# Patient Record
Sex: MALE | Race: WHITE | Employment: FULL TIME | URBAN - METROPOLITAN AREA
[De-identification: names, ages, dates, MRNs, and addresses within clinical notes are randomized per-mention and may not be internally consistent; named-entity substitution may affect disease eponyms.]

---

## 2021-10-18 ENCOUNTER — HOSPITAL ENCOUNTER (EMERGENCY)
Age: 51
Discharge: HOME | End: 2021-10-18
Attending: EMERGENCY MEDICINE

## 2021-10-18 ENCOUNTER — APPOINTMENT (OUTPATIENT)
Dept: GENERAL RADIOLOGY | Age: 51
End: 2021-10-18
Attending: EMERGENCY MEDICINE

## 2021-10-18 VITALS
OXYGEN SATURATION: 96 % | HEIGHT: 68 IN | HEART RATE: 91 BPM | BODY MASS INDEX: 45.47 KG/M2 | WEIGHT: 300 LBS | TEMPERATURE: 97.9 F | RESPIRATION RATE: 20 BRPM | DIASTOLIC BLOOD PRESSURE: 86 MMHG | SYSTOLIC BLOOD PRESSURE: 132 MMHG

## 2021-10-18 DIAGNOSIS — S49.91XA INJURY OF RIGHT SHOULDER, INITIAL ENCOUNTER: Primary | ICD-10-CM

## 2021-10-18 PROCEDURE — 74011636637 HC RX REV CODE- 636/637: Performed by: EMERGENCY MEDICINE

## 2021-10-18 PROCEDURE — 73030 X-RAY EXAM OF SHOULDER: CPT

## 2021-10-18 PROCEDURE — 99283 EMERGENCY DEPT VISIT LOW MDM: CPT

## 2021-10-18 PROCEDURE — 72050 X-RAY EXAM NECK SPINE 4/5VWS: CPT

## 2021-10-18 RX ORDER — HYDROCODONE BITARTRATE AND ACETAMINOPHEN 5; 325 MG/1; MG/1
1 TABLET ORAL
Qty: 12 TABLET | Refills: 0 | Status: SHIPPED | OUTPATIENT
Start: 2021-10-18 | End: 2021-10-21

## 2021-10-18 RX ORDER — PREDNISONE 50 MG/1
50 TABLET ORAL DAILY
Qty: 7 TABLET | Refills: 0 | Status: SHIPPED | OUTPATIENT
Start: 2021-10-18 | End: 2021-10-25

## 2021-10-18 RX ADMIN — PREDNISONE 60 MG: 10 TABLET ORAL at 10:20

## 2021-10-18 NOTE — ED TRIAGE NOTES
Pt states he was doing some work around the house last Tuesday and has been having increasing pain in right shoulder, right neck, and now having some tingling and numbness in right hand. States he has had shoulder surgery years ago. Very painful to lift arm to side or in front on him. Masked for triage.

## 2021-10-18 NOTE — ED PROVIDER NOTES
Patient presents with complaints of right shoulder pain onset about 2 days ago. There is gradually gotten worse. The pain is worse if he lays on his right shoulder trying to sleep. He complains of some paresthesias of the right thumb and index and middle fingers as well. He has a history of previous injury to the rotator cuff of the right shoulder with surgical repair in the past.  He denies any overt injury but states he was working on his jeep 2 days ago for an extended period of time and he is right-hand dominant. The history is provided by the patient. Shoulder Pain   The incident occurred 2 days ago. The incident occurred at home. Injury mechanism: Possibly overuse. The right shoulder is affected. The pain is at a severity of 8/10. The pain is severe. The pain has been constant since onset. The pain radiates. There is a history of shoulder injury. He has no other injuries. There is a history of shoulder surgery. Associated symptoms include numbness. Patient presents    No past medical history on file. No past surgical history on file. No family history on file. Social History     Socioeconomic History    Marital status: SINGLE     Spouse name: Not on file    Number of children: Not on file    Years of education: Not on file    Highest education level: Not on file   Occupational History    Not on file   Tobacco Use    Smoking status: Not on file   Substance and Sexual Activity    Alcohol use: Not on file    Drug use: Not on file    Sexual activity: Not on file   Other Topics Concern    Not on file   Social History Narrative    Not on file     Social Determinants of Health     Financial Resource Strain:     Difficulty of Paying Living Expenses:    Food Insecurity:     Worried About Running Out of Food in the Last Year:     [address] in the Last Year:    Transportation Needs:     Lack of Transportation (Medical):      Lack of Transportation (Non-Medical):    Physical Activity:     Days of Exercise per Week:     Minutes of Exercise per Session:    Stress:     Feeling of Stress :    Social Connections:     Frequency of Communication with Friends and Family:     Frequency of Social Gatherings with Friends and Family:     Attends Confucianism Services:     Active Member of Clubs or Organizations:     Attends Club or Organization Meetings:     Marital Status:    Intimate Partner Violence:     Fear of Current or Ex-Partner:     Emotionally Abused:     Physically Abused:     Sexually Abused: ALLERGIES: Patient has no known allergies. Review of Systems   Neurological: Positive for numbness. All other systems reviewed and are negative. Vitals:    10/18/21 1000   BP: 137/64   Pulse: 91   Resp: 20   Temp: 97.9 °F (36.6 °C)   SpO2: 98%   Weight: 136.1 kg (300 lb)   Height: 5' 8\" (1.727 m)            Physical Exam  Vitals and nursing note reviewed. Constitutional:       General: He is not in acute distress. Appearance: Normal appearance. He is not ill-appearing, toxic-appearing or diaphoretic. HENT:      Head: Normocephalic and atraumatic. Neck:      Comments: Tenderness is noted to the right side of the cervical spine extending into the teres major and trapezius. Spurling test positive. Musculoskeletal:         General: Tenderness present. Normal range of motion. Cervical back: Normal range of motion. Tenderness present. No rigidity. Comments: There is tenderness noted anterior laterally and posteriorly to the shoulder girdle. Decreased range of motion in all planes. There is weakness on resistance to lateral extension. Lymphadenopathy:      Cervical: No cervical adenopathy. Skin:     General: Skin is warm and dry. Capillary Refill: Capillary refill takes less than 2 seconds. Neurological:      General: No focal deficit present. Mental Status: He is alert and oriented to person, place, and time.  Mental status is at baseline. Psychiatric:         Mood and Affect: Mood normal.         Behavior: Behavior normal.         Thought Content: Thought content normal.          MDM  Number of Diagnoses or Management Options  Diagnosis management comments: Differential diagnosis is rotator cuff injury or adhesive capsulitis versus cervical radiculopathy.        Amount and/or Complexity of Data Reviewed  Tests in the radiology section of CPT®: ordered and reviewed  Independent visualization of images, tracings, or specimens: yes    Risk of Complications, Morbidity, and/or Mortality  Presenting problems: low  Diagnostic procedures: low  Management options: low    Patient Progress  Patient progress: stable         Procedures

## 2021-10-18 NOTE — ED NOTES
I have reviewed discharge instructions with the patient. The patient verbalized understanding. Patient left ED via Discharge Method: ambulatory to Home with self. Opportunity for questions and clarification provided. Patient given 2 scripts. To continue your aftercare when you leave the hospital, you may receive an automated call from our care team to check in on how you are doing. This is a free service and part of our promise to provide the best care and service to meet your aftercare needs.  If you have questions, or wish to unsubscribe from this service please call [phone]. Thank you for Choosing our [facility] Emergency Department.

## 2022-07-13 ENCOUNTER — HOSPITAL ENCOUNTER (EMERGENCY)
Dept: ULTRASOUND IMAGING | Age: 52
Discharge: HOME | End: 2022-07-16

## 2022-07-13 ENCOUNTER — HOSPITAL ENCOUNTER (EMERGENCY)
Dept: GENERAL RADIOLOGY | Age: 52
Discharge: HOME | End: 2022-07-16

## 2022-07-13 ENCOUNTER — HOSPITAL ENCOUNTER (EMERGENCY)
Age: 52
Discharge: HOME | End: 2022-07-13
Attending: EMERGENCY MEDICINE

## 2022-07-13 VITALS
RESPIRATION RATE: 17 BRPM | HEART RATE: 74 BPM | OXYGEN SATURATION: 94 % | DIASTOLIC BLOOD PRESSURE: 76 MMHG | WEIGHT: 260 LBS | TEMPERATURE: 98 F | SYSTOLIC BLOOD PRESSURE: 130 MMHG | HEIGHT: 67 IN | BODY MASS INDEX: 40.81 KG/M2

## 2022-07-13 DIAGNOSIS — M79.661 RIGHT CALF PAIN: Primary | ICD-10-CM

## 2022-07-13 DIAGNOSIS — M77.31 HEEL SPUR, RIGHT: ICD-10-CM

## 2022-07-13 PROCEDURE — 73630 X-RAY EXAM OF FOOT: CPT

## 2022-07-13 PROCEDURE — 99283 EMERGENCY DEPT VISIT LOW MDM: CPT

## 2022-07-13 PROCEDURE — 73610 X-RAY EXAM OF ANKLE: CPT

## 2022-07-13 PROCEDURE — 93971 EXTREMITY STUDY: CPT

## 2022-07-13 RX ORDER — NAPROXEN 500 MG/1
500 TABLET ORAL 2 TIMES DAILY WITH MEALS
Qty: 14 TABLET | Refills: 0 | Status: SHIPPED | OUTPATIENT
Start: 2022-07-13 | End: 2022-09-30 | Stop reason: ALTCHOICE

## 2022-07-13 ASSESSMENT — PAIN - FUNCTIONAL ASSESSMENT: PAIN_FUNCTIONAL_ASSESSMENT: 0-10

## 2022-07-13 ASSESSMENT — PAIN DESCRIPTION - LOCATION
LOCATION: FOOT
LOCATION: LEG;FOOT

## 2022-07-13 ASSESSMENT — ENCOUNTER SYMPTOMS
SHORTNESS OF BREATH: 0
COLOR CHANGE: 0
NAUSEA: 0
ABDOMINAL PAIN: 0
VOMITING: 0

## 2022-07-13 ASSESSMENT — PAIN DESCRIPTION - DESCRIPTORS
DESCRIPTORS: OTHER (COMMENT)
DESCRIPTORS: TENDER

## 2022-07-13 ASSESSMENT — PAIN SCALES - GENERAL
PAINLEVEL_OUTOF10: 8
PAINLEVEL_OUTOF10: 10

## 2022-07-13 ASSESSMENT — PAIN DESCRIPTION - ORIENTATION
ORIENTATION: LOWER;POSTERIOR
ORIENTATION: RIGHT

## 2022-07-13 NOTE — ED NOTES
Patient has pain on the back/outside of the heal near the patricia tendon that goes up the back/outside of the leg to just below the knee. Hurts when sitting but increases when moving. Also hurts on the top of the foot as well. Tender to touch.         Keara Babcock RN  07/13/22 2979

## 2022-07-13 NOTE — ED TRIAGE NOTES
Patient has been having right calf, ankle, heel pain for the past for several weeks. States it has progressively worsened. States he has a notable knot on the back of his heel and top of his foot that are painful. The calf is swollen and painful for him. States he decided he finally needed to be seen. Tried muscle relaxants with no relief. Hx of blood clots and PE.    PE  Calf edematous and tender to palpation, painful ROM; small knot on the posterior heel where the achilles inserts that is tender; (-) Petty test    Patient evaluated initially in triage. Rapid Medical Evaluation was conducted and necessary orders have been placed. I have performed a medical screening exam.  Care will now be transferred to the provider in the back of the emergency department.   Rubens Feliciano PA-C 11:06 AM

## 2022-07-13 NOTE — ED TRIAGE NOTES
Pt c/o pain to top of right foot months ago, right calf and bottom of right foot pain for several weeks. Pt states he has a knot on top of right foot. Pt states his right toes are numb. Pt ambulatory to triage with a limp. Pt states he has a history of blood clots.

## 2022-07-13 NOTE — ED PROVIDER NOTES
[facility] Emergency Department Provider Note                   PCP:                No primary care provider on file. Age: 50 y.o. Sex: male       ICD-10-CM    1. Right calf pain  M79.661 Vascular duplex lower extremity venous right     Vascular duplex lower extremity venous right       DISPOSITION          MDM  Number of Diagnoses or Management Options  Heel spur, right  Right calf pain  Diagnosis management comments: Patient presents today with some right leg pain. On exam patient is well-appearing and uncomfortable but not in acute distress. Stable vital signs. Given physical exam findings and ultrasound of the extremity was ordered which was normal.  X-rays demonstrate a heel spur but no other findings. Discussed anti-inflammatories, ice and elevation, and extra padding on the heel as options to help with symptoms at home. Encouraged follow-up with his family doctor. Patient understanding and agreeable to the plan as discussed. Patient ambulatory out of the department in stable condition. Amount and/or Complexity of Data Reviewed  Tests in the radiology section of CPT®: reviewed and ordered  Review and summarize past medical records: yes  Independent visualization of images, tracings, or specimens: yes    Risk of Complications, Morbidity, and/or Mortality  Presenting problems: low  Diagnostic procedures: low  Management options: low  General comments: Vascular duplex lower extremity venous right   Final Result    No evidence of deep venous thrombosis in the right lower extremity         XR FOOT RIGHT (MIN 3 VIEWS)   Final Result        1. No acute osseous or joint abnormalities. XR ANKLE RIGHT (MIN 3 VIEWS)   Final Result        1. No acute osseous or joint abnormalities.                          Patient Progress  Patient progress: stable       Orders Placed This Encounter   Procedures    XR FOOT RIGHT (MIN 3 VIEWS)    XR ANKLE RIGHT (MIN 3 VIEWS) Chuy Rowland is a 50 y.o. male who presents to the Emergency Department with chief complaint of    Chief Complaint   Patient presents with    Foot Pain    Leg Pain      Patient has been having right calf, ankle, heel pain for the past for several weeks. States it has progressively worsened. States he has a notable knot on the back of his heel and top of his foot that are painful. The calf is swollen and painful for him. States he decided he finally needed to be seen. Tried muscle relaxants with no relief. Hx of blood clots and PE. The history is provided by the patient. Review of Systems   Constitutional: Negative for chills and fever. Respiratory: Negative for shortness of breath. Cardiovascular: Negative for chest pain. Gastrointestinal: Negative for abdominal pain, nausea and vomiting. Musculoskeletal: Positive for arthralgias (right foot/heel), gait problem (antalgic) and myalgias (right calf). Skin: Negative for color change and rash. Neurological: Negative for dizziness, syncope and headaches. Psychiatric/Behavioral: Negative for agitation and behavioral problems. All other systems reviewed and are negative. Past Medical History:   Diagnosis Date    H/O blood clots     Hypertension     Restless leg syndrome         Past Surgical History:   Procedure Laterality Date    FRACTURE SURGERY      right shoulder        History reviewed. No pertinent family history. Social Connections:     Frequency of Communication with Friends and Family: Not on file    Frequency of Social Gatherings with Friends and Family: Not on file    Attends Alevism Services: Not on file    Active Member of Clubs or Organizations: Not on file    Attends Club or Organization Meetings: Not on file    Marital Status: Not on file        No Known Allergies     Vitals signs and nursing note reviewed.    Patient Vitals for the past 4 hrs:   Temp Pulse Resp BP SpO2   07/13/22 1102 98.5 °F (36.9 °C) 90 17 (!) 145/91 97 %          Physical Exam  Vitals and nursing note reviewed. Constitutional:       General: He is not in acute distress. Appearance: Normal appearance. He is not ill-appearing. HENT:      Head: Normocephalic and atraumatic. Right Ear: External ear normal.      Left Ear: External ear normal.   Eyes:      Extraocular Movements: Extraocular movements intact. Conjunctiva/sclera: Conjunctivae normal.   Cardiovascular:      Rate and Rhythm: Normal rate and regular rhythm. Pulses: Normal pulses. Heart sounds: Normal heart sounds. Pulmonary:      Effort: Pulmonary effort is normal.      Breath sounds: Normal breath sounds. Abdominal:      General: Abdomen is flat. Musculoskeletal:         General: Swelling (right calf) and tenderness (posterior R heel at achilles insertion site, dorsum of foot, right calf) present. Normal range of motion. Cervical back: Normal range of motion. Comments: (-) Petty test   Skin:     General: Skin is warm and dry. Capillary Refill: Capillary refill takes less than 2 seconds. Findings: No erythema, lesion or rash. Neurological:      General: No focal deficit present. Mental Status: He is alert and oriented to person, place, and time. Psychiatric:         Mood and Affect: Mood normal.         Behavior: Behavior normal.          Procedures      Labs Reviewed - No data to display     XR FOOT RIGHT (MIN 3 VIEWS)    (Results Pending)   XR ANKLE RIGHT (MIN 3 VIEWS)    (Results Pending)   Vascular duplex lower extremity venous right    (Results Pending)                          Voice dictation software was used during the making of this note. This software is not perfect and grammatical and other typographical errors may be present. This note has not been completely proofread for errors.      Miguel Parker PA-C  07/13/22 4240

## 2022-07-13 NOTE — ED NOTES
I have reviewed discharge instructions with the patient. The patient verbalized understanding. Patient left ED via Discharge Method: ambulatory to Home with self    Opportunity for questions and clarification provided. Patient given 1 scripts. To continue your aftercare when you leave the hospital, you may receive an automated call from our care team to check in on how you are doing. This is a free service and part of our promise to provide the best care and service to meet your aftercare needs.  If you have questions, or wish to unsubscribe from this service please call [phone]. Thank you for Choosing our [facility] Emergency Department.         Zelda Carmichael RN  07/13/22 7858

## 2022-09-02 ENCOUNTER — HOSPITAL ENCOUNTER (EMERGENCY)
Dept: GENERAL RADIOLOGY | Age: 52
Discharge: HOME | End: 2022-09-05

## 2022-09-02 ENCOUNTER — HOSPITAL ENCOUNTER (EMERGENCY)
Age: 52
Discharge: HOME | End: 2022-09-02
Attending: EMERGENCY MEDICINE

## 2022-09-02 VITALS
HEART RATE: 94 BPM | SYSTOLIC BLOOD PRESSURE: 138 MMHG | DIASTOLIC BLOOD PRESSURE: 91 MMHG | RESPIRATION RATE: 16 BRPM | HEIGHT: 66 IN | TEMPERATURE: 98.3 F | OXYGEN SATURATION: 97 % | BODY MASS INDEX: 45 KG/M2 | WEIGHT: 280 LBS

## 2022-09-02 DIAGNOSIS — M25.461 EFFUSION OF RIGHT KNEE: ICD-10-CM

## 2022-09-02 DIAGNOSIS — M25.561 ACUTE PAIN OF RIGHT KNEE: Primary | ICD-10-CM

## 2022-09-02 PROCEDURE — 73562 X-RAY EXAM OF KNEE 3: CPT

## 2022-09-02 PROCEDURE — 99283 EMERGENCY DEPT VISIT LOW MDM: CPT

## 2022-09-02 RX ORDER — HYDROCODONE BITARTRATE AND ACETAMINOPHEN 7.5; 325 MG/1; MG/1
1 TABLET ORAL EVERY 6 HOURS PRN
Qty: 15 TABLET | Refills: 0 | Status: SHIPPED | OUTPATIENT
Start: 2022-09-02 | End: 2022-09-07

## 2022-09-02 ASSESSMENT — ENCOUNTER SYMPTOMS
RHINORRHEA: 0
COUGH: 0
VOMITING: 0
SORE THROAT: 0
BACK PAIN: 0
SHORTNESS OF BREATH: 0
COLOR CHANGE: 0

## 2022-09-02 ASSESSMENT — PAIN SCALES - GENERAL: PAINLEVEL_OUTOF10: 8

## 2022-09-02 ASSESSMENT — PAIN - FUNCTIONAL ASSESSMENT: PAIN_FUNCTIONAL_ASSESSMENT: 0-10

## 2022-09-02 ASSESSMENT — PAIN DESCRIPTION - LOCATION: LOCATION: LEG;KNEE

## 2022-09-02 ASSESSMENT — PAIN DESCRIPTION - ORIENTATION: ORIENTATION: RIGHT

## 2022-09-02 NOTE — DISCHARGE INSTR - COC
Continuity of Care Form    Patient Name: Kassy Lundberg   :  1970  MRN:  [id number]    Admit date:  2022  Discharge date:  ***    Code Status Order: No Order   Advance Directives:     Admitting Physician:  No admitting provider for patient encounter. PCP: None Provider    Discharging Nurse: [facility] Unit/Room#: ER07/07  Discharging Unit Phone Number: ***    Emergency Contact:   Extended Emergency Contact Information  Primary Emergency Contact: [address] [place] [address]  Mobile Phone: [phone]  Relation: Other    Past Surgical History:  Past Surgical History:   Procedure Laterality Date    FRACTURE SURGERY      right shoulder       Immunization History: There is no immunization history on file for this patient. Active Problems: There is no problem list on file for this patient. Isolation/Infection:   Isolation            No Isolation          Patient Infection Status       None to display            Nurse Assessment:  Last Vital Signs: BP (!) 138/91   Pulse 94   Temp 98.3 °F (36.8 °C) (Oral)   Resp 16   Ht 5' 6\" (1.676 m)   Wt 280 lb (127 kg)   SpO2 97%   BMI 45.19 kg/m²     Last documented pain score (0-10 scale): Pain Level: 8  Last Weight:   Wt Readings from Last 1 Encounters:   22 280 lb (127 kg)     Mental Status:  {IP PT MENTAL STATUS:85407}    IV Access:  { FER IV ACCESS:199737259}    Nursing Mobility/ADLs:  Walking   {CHP DME WYJN:232427184}  Transfer  {CHP DME KGYK:603700648}  Bathing  {CHP DME YUGW:758830235}  Dressing  {CHP DME BHYL:803962535}  Toileting  {CHP DME PZCQ:260085809}  Feeding  {CHP DME LMBP:750682806}  Med Admin  {P DME HSCW:913749643}  Med Delivery   { FER MED Delivery:033896807}    Wound Care Documentation and Therapy:        Elimination:  Continence:    Bowel: {YES / QK:00647}  Bladder: {YES / VE:62658}  Urinary Catheter: {Urinary Catheter:940096156}   Colostomy/Ileostomy/Ileal Conduit: {YES / ND:32368}       Date of Last BM: ***  No intake or output data in the 24 hours ending 22 0930  No intake/output data recorded.     Safety Concerns:     [facility] Safety Concerns:839292986}    Impairments/Disabilities:      [place] [facility] Impairments/Disabilities:099600410}    Nutrition Therapy:  Current Nutrition Therapy:   [facility] Diet List:850037816}    Routes of Feeding: {CHP DME Other Feedings:077400876}  Liquids: {Slp liquid thickness:09231}  Daily Fluid Restriction: {CHP DME Yes amt example:751869550}  Last Modified Barium Swallow with Video (Video Swallowing Test): {Done Not Done JMYS:124736090}    Treatments at the Time of Hospital Discharge:   Respiratory Treatments: ***  Oxygen Therapy:  {Therapy; copd oxygen:73408}  Ventilator:    { CC Vent GFVB:362094181}    Rehab Therapies: {THERAPEUTIC INTERVENTION:6941075461}  Weight Bearing Status/Restrictions: [place]  Weight Bearin}  Other Medical Equipment (for information only, NOT a DME order):  {EQUIPMENT:826294610}  Other Treatments: ***    Patient's personal belongings (please select all that are sent with patient):  {CHP DME Belongings:010631860}    RN SIGNATURE:  {Esignature:616268946}    CASE MANAGEMENT/SOCIAL WORK SECTION    Inpatient Status Date: ***    Readmission Risk Assessment Score:  Readmission Risk              Risk of Unplanned Readmission:  0           Discharging to Facility/ Agency   Name:   Address:  Phone:  Fax:    Dialysis Facility (if applicable)   Name:  Address:  Dialysis Schedule:  Phone:  Fax:    / signature: {Esignature:037767071}    PHYSICIAN SECTION    Prognosis: {Prognosis:8734227649}    Condition at Discharge: [place] Patient Condition:634282441}    Rehab Potential (if transferring to Rehab): {Prognosis:2966282434}    Recommended Labs or Other Treatments After Discharge: ***    Physician Certification: I certify the above information and transfer of Luis Antonio Alcantara  is necessary for the continuing treatment of the diagnosis listed and that he requires {Admit to Appropriate Level of Care:56963} for {GREATER/LESS:813489568} 30 days.      Update Admission H&P: {CHP DME Changes in CTKNE:545997556}    PHYSICIAN SIGNATURE:  {Esignature:655689716}

## 2022-09-02 NOTE — ED NOTES
I have reviewed discharge instructions with the patient. The patient verbalized understanding. Patient left ED via Discharge Method: ambulatory to Home with SELF. Opportunity for questions and clarification provided. Patient given 1 scripts. To continue your aftercare when you leave the hospital, you may receive an automated call from our care team to check in on how you are doing. This is a free service and part of our promise to provide the best care and service to meet your aftercare needs.  If you have questions, or wish to unsubscribe from this service please call [phone]. Thank you for Choosing our [facility] Emergency Department.       Angelica Andrews RN  09/02/22 1002

## 2022-09-02 NOTE — ED PROVIDER NOTES
[facility] Emergency Department Provider Note                   PCP:                None Provider               Age: 50 y.o. Sex: male       ICD-10-CM    1. Acute pain of right knee  M25.561 HYDROcodone-acetaminophen (NORCO) 7.5-325 MG per tablet      2. Effusion of right knee  M25.461           DISPOSITION Decision To Discharge 09/02/2022 09:23:23 AM       MDM  Number of Diagnoses or Management Options  Diagnosis management comments: Patient with knee pain and effusion. Joint feels lax while walking. Will place in a knee immobilizer with crutches and refer to Ortho. Amount and/or Complexity of Data Reviewed  Tests in the radiology section of CPT®: ordered and reviewed    Patient Progress  Patient progress: stable       Orders Placed This Encounter   Procedures    XR KNEE RIGHT (3 VIEWS)    DME Order for (Specify) as OP    DME Order for (Specify) as OP        Medications - No data to display    New Prescriptions    HYDROCODONE-ACETAMINOPHEN (NORCO) 7.5-325 MG PER TABLET    Take 1 tablet by mouth every 6 hours as needed for Pain for up to 5 days. Intended supply: 5 days. Take lowest dose possible to manage pain        Sharda Fernandez is a 50 y.o. male who presents to the Emergency Department with chief complaint of    Chief Complaint   Patient presents with    Leg Pain    Knee Pain      Patient with right knee pain and swelling after a fall yesterday. Now feels like the knee is unstable whenever he walks on it. With pain continuing came in today for evaluation. The history is provided by the patient. No  was used. Knee Pain  This is a new problem. The current episode started yesterday. The problem occurs constantly. The problem has not changed since onset. Pertinent negatives include no chest pain, no headaches and no shortness of breath. The symptoms are aggravated by standing and bending.  The symptoms are relieved by rest.     All other systems reviewed and are negative unless otherwise stated in the history of present illness section. Review of Systems   Constitutional:  Negative for chills and fever. HENT:  Negative for rhinorrhea and sore throat. Respiratory:  Negative for cough and shortness of breath. Cardiovascular:  Negative for chest pain and palpitations. Gastrointestinal:  Negative for vomiting. Genitourinary:  Negative for dysuria and hematuria. Musculoskeletal:  Positive for arthralgias, gait problem and joint swelling. Negative for back pain and neck pain. Skin:  Negative for color change and rash. Neurological:  Negative for numbness and headaches. All other systems reviewed and are negative. Past Medical History:   Diagnosis Date    H/O blood clots     Hypertension     Restless leg syndrome         Past Surgical History:   Procedure Laterality Date    FRACTURE SURGERY      right shoulder        No family history on file. Social History     Socioeconomic History    Marital status: Single   Tobacco Use    Smoking status: Every Day     Packs/day: 0.50     Types: Cigarettes    Smokeless tobacco: Never   Substance and Sexual Activity    Alcohol use: Yes    Drug use: Never        Allergies: Patient has no known allergies. Previous Medications    NAPROXEN (NAPROSYN) 500 MG TABLET    Take 1 tablet by mouth 2 times daily (with meals) for 7 days        Vitals signs and nursing note reviewed. Patient Vitals for the past 4 hrs:   Temp Pulse Resp BP SpO2   09/02/22 0745 98.3 °F (36.8 °C) 94 16 (!) 138/91 97 %          Physical Exam  Vitals and nursing note reviewed. Constitutional:       Appearance: Normal appearance. HENT:      Head: Normocephalic and atraumatic. Cardiovascular:      Rate and Rhythm: Normal rate and regular rhythm. Pulmonary:      Effort: Pulmonary effort is normal.      Breath sounds: Normal breath sounds. No wheezing. Musculoskeletal:         General: Swelling and tenderness (right knee TTP. FROM but with pain. distal pulse and sensation intact.) present. Normal range of motion. Cervical back: Normal range of motion. No tenderness. Skin:     General: Skin is warm and dry. Findings: No erythema. Neurological:      Mental Status: He is alert. Procedures      Results Include:    No results found for this or any previous visit (from the past 24 hour(s)). XR KNEE RIGHT (3 VIEWS)   Final Result      1. No acute osseous abnormality. 2.  Moderate joint effusion and mild prepatellar soft tissue edema. Voice dictation software was used during the making of this note. This software is not perfect and grammatical and other typographical errors may be present. This note has not been completely proofread for errors.      Vangie Romo III, MD  09/02/22 9533

## 2022-09-09 ENCOUNTER — OFFICE VISIT (OUTPATIENT)
Dept: ORTHOPEDIC SURGERY | Age: 52
End: 2022-09-09

## 2022-09-09 VITALS — HEIGHT: 66 IN | BODY MASS INDEX: 45 KG/M2 | WEIGHT: 280 LBS

## 2022-09-09 DIAGNOSIS — M25.561 RIGHT KNEE PAIN, UNSPECIFIED CHRONICITY: Primary | ICD-10-CM

## 2022-09-09 RX ORDER — VENLAFAXINE HYDROCHLORIDE 150 MG/1
CAPSULE, EXTENDED RELEASE ORAL
COMMUNITY

## 2022-09-09 NOTE — PROGRESS NOTES
Patient was scheduled without any work comp information and does not wish to pay for this visit out-of-pocket therefore we did not see him today.

## 2022-09-14 ENCOUNTER — CLINICAL DOCUMENTATION (OUTPATIENT)
Dept: ORTHOPEDIC SURGERY | Age: 52
End: 2022-09-14

## 2022-09-14 NOTE — PROGRESS NOTES
THIS PATIENT HAD AN EARLIER APPT ON 9/9/22 BUT BECAUSE WE DID NOT HAVE HIS WC INFO I BELIEVE HE CHOSE NOT TO BE SEEN. I RECEVED THE WC INFO TODAY SO I HAVE UPDATED HIS INS INFO TO WC AND RESCHEDULED AN APPT WITH WILLARD YADAV.

## 2022-09-30 ENCOUNTER — CLINICAL DOCUMENTATION (OUTPATIENT)
Dept: ORTHOPEDIC SURGERY | Age: 52
End: 2022-09-30

## 2022-09-30 ENCOUNTER — OFFICE VISIT (OUTPATIENT)
Dept: ORTHOPEDIC SURGERY | Age: 52
End: 2022-09-30

## 2022-09-30 VITALS — HEIGHT: 67 IN | BODY MASS INDEX: 43.95 KG/M2 | WEIGHT: 280 LBS

## 2022-09-30 DIAGNOSIS — S83.241D TEAR OF MEDIAL MENISCUS OF RIGHT KNEE, CURRENT, UNSPECIFIED TEAR TYPE, SUBSEQUENT ENCOUNTER: ICD-10-CM

## 2022-09-30 DIAGNOSIS — M25.561 RIGHT KNEE PAIN, UNSPECIFIED CHRONICITY: Primary | ICD-10-CM

## 2022-09-30 RX ORDER — DICLOFENAC SODIUM 75 MG/1
75 TABLET, DELAYED RELEASE ORAL 2 TIMES DAILY
Qty: 60 TABLET | Refills: 0 | Status: SHIPPED | OUTPATIENT
Start: 2022-09-30

## 2022-09-30 NOTE — LETTER
[address],[facility]  Phone: [phone]  Fax: [phone]    Garry Jeans, APRN - CNP        September 30, 2022     Patient: Lauren Maguire   YOB: 1970   Date of Visit: 9/30/2022       To Whom It May Concern: It is my medical opinion that Lauren Maguire will remain out of work until after his follow up appointment with Dr. Adrianne Lee. .    If you have any questions or concerns, please don't hesitate to call.     Sincerely,        Garry Jeans, APRN - CNP

## 2022-09-30 NOTE — PROGRESS NOTES
Name: Dorian Treviño  YOB: 1970  Gender: male  MRN: [id number]      CC: Knee Pain (Right, XR (PACS) 9/2/2022. Had MRI done at [facility]. 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 Leg twisted under him. DOI 9/1/2022)       HPI: Dorian Treviño is a 50 y.o. male who presents with Right knee pain from an injury at work on 9/1/2022. 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 He states he came around the corner and caught the corner of the carpet with his foot. The carpet caught his leg causing him to fall and his leg twisted under him. He reports significant effusion at the time of injury and is currently treating with OTC NSAIDs and a knee sleeve. ROS/Meds/PSH/PMH/FH/SH: I personally reviewed the patients standard intake form. Below are the pertinents    Tobacco:  reports that he has been smoking cigarettes. He has been smoking an average of .5 packs per day. He has never used smokeless tobacco.  Diabetes: none  Other: HTN, history of DVT    Physical Examination:  General: no acute distress  Lungs: breathing easily  CV: regular rhythm by pulse  Right Knee: Moderate effusion present. Tenderness to palpation the medial joint line. Active extension 5 degrees short of full extension passively to full extension, flexion to 120 degrees. Negative ligamentous exam x4. Pain with Mirlande's and reproduction of patient's symptoms medial joint line. Positive bounce home test.      Imaging:   I reviewed 3 views of the knee performed on 9/2/2022 which showed no acute fracture, dislocation or advanced degenerative changes. I also reviewed an MRI performed at [address] on 9/16/2022 which shows a vertical oblique tear of the posterior horn the medial meniscus. Moderate contusion posterior medial tibial plateau.   Chondral thinning and fissuring in the weightbearing surface of the medial femoral condyle. All imaging interpreted by myself AUGIE Gonzalez independent of radiology review        Assessment:   1. Right knee pain, unspecified chronicity    2. Tear of medial meniscus of right knee, current, unspecified tear type, subsequent encounter         Plan:   I reviewed the MRI results with the patient which shows a meniscus tear which is consistent with clinical exam.  I discussed with the patient conservative treatment options to include corticosteroid injection and formal physical therapy, but he wishes to have his knee pain addressed definitively. I explained to the patient that due to the chondral thinning in the weightbearing surface of medial femoral condyle he may not get full relief of his knee pain with knee arthroscopy. He wishes to forego any further conservative treatment which I think is reasonable and I will refer him to Dr. Malia Morelos for surgical consultation versus continued conservative treatment. Provided him with an out of work note until he is reevaluated by the surgeon. For his knee pain and swelling the meantime I will prescribe him 75 mg diclofenac twice daily.         Taisha Eng MS, APRN, FNP-BC  Orthopaedics and Sports Medicine

## 2022-10-05 ENCOUNTER — CLINICAL DOCUMENTATION (OUTPATIENT)
Dept: ORTHOPEDIC SURGERY | Age: 52
End: 2022-10-05

## 2022-10-18 ENCOUNTER — OFFICE VISIT (OUTPATIENT)
Dept: ORTHOPEDIC SURGERY | Age: 52
End: 2022-10-18

## 2022-10-18 DIAGNOSIS — M25.561 RIGHT KNEE PAIN, UNSPECIFIED CHRONICITY: ICD-10-CM

## 2022-10-18 DIAGNOSIS — S83.241A ACUTE MEDIAL MENISCUS TEAR OF RIGHT KNEE, INITIAL ENCOUNTER: Primary | ICD-10-CM

## 2022-10-18 PROCEDURE — 99204 OFFICE O/P NEW MOD 45 MIN: CPT | Performed by: ORTHOPAEDIC SURGERY

## 2022-10-18 NOTE — LETTER
[address],[facility] [address]  Phone: [phone]  Fax: [phone]    Lauren Rivera MD        October 18, 2022     Patient: Lindsey Burkett   YOB: 1970   Date of Visit: 10/18/2022       To Whom It May Concern:    Appointment date:  October 18, 2022    Lindsey Burkett was in our office for an appointment on the date listed above. Please excuse him/her from work on this day. Please see notes regarding work status:    - out of work until next appointment    Follow-up appointment: pending surgery     If you have any questions or concerns, please don't hesitate to call.     Sincerely,        Lauren Rivera MD

## 2022-10-18 NOTE — PROGRESS NOTES
Name: Grace Paniagua  YOB: 1970  Gender: male  MRN: [id number]      CC: Knee Pain (R knee)       HPI: Grace Paniagua is a 50 y.o. male who presents with Knee Pain (R knee)  Mr. Jia Frank is a new patient who has been referred by Dulce Simpson NP for evaluation and treatment of his right knee. He sustained an injury at work on 9/1/2022. 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 He states he came around the corner and caught the corner of the carpet with his foot. The carpet caught his leg causing him to fall and his leg twisted under him. At the time of his visit with Paolo Middleton his injury was covered under worker's compensation, however we have recently been notified that his claim has been denied. The patient was informed today that he will be self pay now and he elects to proceed as planned at his 's advice. He has an MRI and x-rays for my review today. Treatment thus far includes Diclofenac that was prescribed by Paolo Middleton. He denies any history of knee pain prior to this injury. He did report the immediate onset of effusion after the injury. ROS/Meds/PSH/PMH/FH/SH: I personally reviewed the patients standard intake form. Below are the pertinents    Tobacco:  reports that he has been smoking cigarettes. He has been smoking an average of .5 packs per day. He has never used smokeless tobacco.  Diabetes: none  Other: history of DVT    Physical Examination:  General: no acute distress   Lungs: breathing easily  CV: regular rhythm by pulse  Right Knee: Tenderness to palpation of the medial joint line. Pain with Mirlande's with recreation of symptoms over the medial joint line. Passive extension to neutral flexion to about 100 agrees with pain at the extremes of the medial joint line. Stable to varus and valgus stress.       Imaging:   I reviewed plain radiographs of the right knee from September 2, 2022 which show normal bony architecture without advanced degenerative changes or acute fracture. Also reviewed an MRI scan from [address] from September 16, 2022 which demonstrates a large vertical oblique tear completely full-thickness radial tear of the posterior horn the medial meniscus about a centimeter from the root attachment with extrusion of the meniscal body. There is some mild chondromalacia likely grade 2 of the medial femoral condyle. All imaging interpreted by myself Jack Whitt MD independent of radiology review        Assessment:     ICD-10-CM    1. Acute medial meniscus tear of right knee, initial encounter  S83.241A       2. Right knee pain, unspecified chronicity  M25.561           Plan:   Acute medial meniscus tear that I am concerned is essentially a root avulsion with extrusion of the body. He does have some very mild pre-existing degenerative changes but based on the effusion the history and the imaging I think it is very reasonable to conclude this is an acute injury that did happen at work. I recommendation would be for knee arthroscopy with likely meniscus repair versus debridement. We discussed the pros and cons of repair versus a debridement and the postoperative recovery associated with both. Including a period of protected weightbearing and brace wear of likely at least 6 weeks with the repair. We discussed the risk and benefits of this as well. He was instructed on home exercise program today with range of motion and quad sets. If he is able to proceed with the surgery then we will see him back for preoperative appointment surgical plan be for right knee arthroscopy medial meniscus repair versus debridement              Jack Whitt MD, [address] [place] [address] and Sports Medicine

## 2022-10-27 ENCOUNTER — TELEPHONE (OUTPATIENT)
Dept: ORTHOPEDIC SURGERY | Age: 52
End: 2022-10-27

## 2022-10-27 NOTE — TELEPHONE ENCOUNTER
Per Sheila Ansari: \"per the [employer] this WC case is denied and has not been reversed. I am scanning documentation into Media from the . Thanks\"

## 2022-10-27 NOTE — TELEPHONE ENCOUNTER
Patient would like to proceed with surgery. I told him we would need documentation from worker's comp that they are covering his injury. Once we have that we can fill out a posting sheet and submit for authorization through worker's comp. Once authorized, Elijah Bauman will call him to schedule. No

## 2022-11-21 ENCOUNTER — TELEPHONE (OUTPATIENT)
Dept: ORTHOPEDIC SURGERY | Age: 52
End: 2022-11-21

## 2022-12-06 ENCOUNTER — OFFICE VISIT (OUTPATIENT)
Dept: ORTHOPEDIC SURGERY | Age: 52
End: 2022-12-06

## 2022-12-06 DIAGNOSIS — M25.561 RIGHT KNEE PAIN, UNSPECIFIED CHRONICITY: ICD-10-CM

## 2022-12-06 DIAGNOSIS — S83.241D ACUTE MEDIAL MENISCUS TEAR OF RIGHT KNEE, SUBSEQUENT ENCOUNTER: Primary | ICD-10-CM

## 2022-12-06 PROCEDURE — 99214 OFFICE O/P EST MOD 30 MIN: CPT | Performed by: ORTHOPAEDIC SURGERY

## 2022-12-06 NOTE — PROGRESS NOTES
Name: Selena Lee  YOB: 1970  Gender: male  MRN: [id number]      CC: Follow-up (Right knee)       HPI: Selena Lee is a 50 y.o. male who returns for follow up on his right knee meniscus tear. This was a work related injury that he has tried to file through his workers compensation, but at this time it is denied. He has an . Today he reports that his pain in the knee has remained unchanged and he still has occasional swelling. He reiterates that he did not have any substantial knee pain prior to this accident in which she had a tripping episode twisting his knee and had onset of swelling and pain since that time. He continues to have swelling with any increased activity such as prolonged standing squatting twisting or climbing. Physical Examination:  General: no acute distress  Lungs: breathing easily  CV: regular rhythm by pulse  Right Knee: Exam of his right knee has tenderness palpation of the medial joint line he does have a mild to moderate effusion. He has pain at the extremes of motion of the medial joint line. He has pain with Mirlande's with recreation of symptoms over the medial joint line. He is ligamentously stable x4. Imaging: I reviewed his MRI scan which does demonstrate a large flap tear potential radial tear the posterior horn medial meniscus is about 1 to 2 cm from the root attachment. He does have some mild arthritic changes with chondral thinning but no significant subchondral edema. Assessment:     ICD-10-CM    1. Acute medial meniscus tear of right knee, subsequent encounter  S83.241D       2. Right knee pain, unspecified chronicity  M25.561           Plan:   He continues to have meniscal signs on exam and pain over the medial joint line. I do think this is consistent with an acute medial meniscus tear.   Based on the patient's report that he had no significant knee pain symptoms prior to this the acuity of the injury the MRI findings which correlate with that and the patient's response with swelling and continued pain and meniscal signs on exam I do feel this is from the acute mechanism of his injury that he describes. We discussed again the pros and cons of surgical nonsurgical intervention given the fact that he continues to have pain and mechanical symptoms I think it is reasonable to proceed with right knee arthroscopy. We discussed again the determination of meniscus debridement versus repair would be based on the appearance of the meniscus at the time of surgery. He expressed understanding and elects to proceed but he would like to sort out his Workmen's Compensation claim prior to that as he is scheduled for a deposition sometime soon. As far as returning to work I think he can return to work once his swelling resolves. Work form was filled out for him to return to work on 1/2/2023 with restrictions of no lifting more than 15 pounds and no squatting twisting climbing or bending. For the full list of his restrictions please reference the [employer] return to work form that was filled out and scanned into the chart today. Paige Martinez MD, [facility] and Sports Medicine

## 2023-01-04 ENCOUNTER — HOSPITAL ENCOUNTER (EMERGENCY)
Age: 53
Discharge: HOME | End: 2023-01-04
Attending: EMERGENCY MEDICINE

## 2023-01-04 VITALS
HEART RATE: 85 BPM | WEIGHT: 280 LBS | TEMPERATURE: 98.2 F | OXYGEN SATURATION: 97 % | SYSTOLIC BLOOD PRESSURE: 140 MMHG | HEIGHT: 67 IN | DIASTOLIC BLOOD PRESSURE: 80 MMHG | RESPIRATION RATE: 16 BRPM | BODY MASS INDEX: 43.95 KG/M2

## 2023-01-04 DIAGNOSIS — R59.0 CERVICAL LYMPHADENOPATHY: Primary | ICD-10-CM

## 2023-01-04 PROBLEM — I26.99 ACUTE PULMONARY EMBOLISM (HCC): Status: ACTIVE | Noted: 2018-05-23

## 2023-01-04 PROBLEM — E66.01 MORBID OBESITY (HCC): Status: ACTIVE | Noted: 2023-01-04

## 2023-01-04 PROBLEM — G47.33 OSA (OBSTRUCTIVE SLEEP APNEA): Status: ACTIVE | Noted: 2018-05-23

## 2023-01-04 LAB
ALBUMIN SERPL-MCNC: 3.9 G/DL (ref 3.5–5)
ALBUMIN/GLOB SERPL: 1 {RATIO} (ref 0.4–1.6)
ALP SERPL-CCNC: 96 U/L (ref 50–136)
ALT SERPL-CCNC: 37 U/L (ref 12–65)
ANION GAP SERPL CALC-SCNC: 7 MMOL/L (ref 2–11)
AST SERPL-CCNC: 25 U/L (ref 15–37)
BASOPHILS # BLD: 0.1 K/UL (ref 0–0.2)
BASOPHILS NFR BLD: 1 % (ref 0–2)
BILIRUB SERPL-MCNC: 0.4 MG/DL (ref 0.2–1.1)
BUN SERPL-MCNC: 13 MG/DL (ref 6–23)
CALCIUM SERPL-MCNC: 9.6 MG/DL (ref 8.3–10.4)
CHLORIDE SERPL-SCNC: 102 MMOL/L (ref 101–110)
CO2 SERPL-SCNC: 28 MMOL/L (ref 21–32)
CREAT SERPL-MCNC: 1.09 MG/DL (ref 0.8–1.5)
DIFFERENTIAL METHOD BLD: ABNORMAL
EOSINOPHIL # BLD: 0.2 K/UL (ref 0–0.8)
EOSINOPHIL NFR BLD: 2 % (ref 0.5–7.8)
ERYTHROCYTE [DISTWIDTH] IN BLOOD BY AUTOMATED COUNT: 13 % (ref 11.9–14.6)
GLOBULIN SER CALC-MCNC: 3.8 G/DL (ref 2.8–4.5)
GLUCOSE SERPL-MCNC: 180 MG/DL (ref 65–100)
HCT VFR BLD AUTO: 49.4 % (ref 41.1–50.3)
HGB BLD-MCNC: 16.5 G/DL (ref 13.6–17.2)
IMM GRANULOCYTES # BLD AUTO: 0 K/UL (ref 0–0.5)
IMM GRANULOCYTES NFR BLD AUTO: 0 % (ref 0–5)
LYMPHOCYTES # BLD: 2.7 K/UL (ref 0.5–4.6)
LYMPHOCYTES NFR BLD: 24 % (ref 13–44)
MCH RBC QN AUTO: 30.1 PG (ref 26.1–32.9)
MCHC RBC AUTO-ENTMCNC: 33.4 G/DL (ref 31.4–35)
MCV RBC AUTO: 90 FL (ref 82–102)
MONOCYTES # BLD: 0.6 K/UL (ref 0.1–1.3)
MONOCYTES NFR BLD: 5 % (ref 4–12)
NEUTS SEG # BLD: 7.9 K/UL (ref 1.7–8.2)
NEUTS SEG NFR BLD: 69 % (ref 43–78)
NRBC # BLD: 0 K/UL (ref 0–0.2)
PLATELET # BLD AUTO: 321 K/UL (ref 150–450)
PMV BLD AUTO: 8.9 FL (ref 9.4–12.3)
POTASSIUM SERPL-SCNC: 3.6 MMOL/L (ref 3.5–5.1)
PROT SERPL-MCNC: 7.7 G/DL (ref 6.3–8.2)
RBC # BLD AUTO: 5.49 M/UL (ref 4.23–5.6)
SARS-COV-2 RDRP RESP QL NAA+PROBE: NOT DETECTED
SODIUM SERPL-SCNC: 137 MMOL/L (ref 133–143)
SOURCE: NORMAL
WBC # BLD AUTO: 11.5 K/UL (ref 4.3–11.1)

## 2023-01-04 PROCEDURE — 85025 COMPLETE CBC W/AUTO DIFF WBC: CPT

## 2023-01-04 PROCEDURE — 87635 SARS-COV-2 COVID-19 AMP PRB: CPT

## 2023-01-04 PROCEDURE — 80053 COMPREHEN METABOLIC PANEL: CPT

## 2023-01-04 PROCEDURE — 6360000002 HC RX W HCPCS: Performed by: STUDENT IN AN ORGANIZED HEALTH CARE EDUCATION/TRAINING PROGRAM

## 2023-01-04 PROCEDURE — 6370000000 HC RX 637 (ALT 250 FOR IP): Performed by: STUDENT IN AN ORGANIZED HEALTH CARE EDUCATION/TRAINING PROGRAM

## 2023-01-04 PROCEDURE — 99284 EMERGENCY DEPT VISIT MOD MDM: CPT | Performed by: STUDENT IN AN ORGANIZED HEALTH CARE EDUCATION/TRAINING PROGRAM

## 2023-01-04 PROCEDURE — 96374 THER/PROPH/DIAG INJ IV PUSH: CPT | Performed by: STUDENT IN AN ORGANIZED HEALTH CARE EDUCATION/TRAINING PROGRAM

## 2023-01-04 RX ORDER — DEXAMETHASONE SODIUM PHOSPHATE 10 MG/ML
10 INJECTION INTRAMUSCULAR; INTRAVENOUS ONCE
Status: COMPLETED | OUTPATIENT
Start: 2023-01-04 | End: 2023-01-04

## 2023-01-04 RX ORDER — AMOXICILLIN AND CLAVULANATE POTASSIUM 875; 125 MG/1; MG/1
1 TABLET, FILM COATED ORAL 2 TIMES DAILY
Qty: 20 TABLET | Refills: 0 | Status: SHIPPED | OUTPATIENT
Start: 2023-01-04 | End: 2023-01-14

## 2023-01-04 RX ORDER — OFLOXACIN 3 MG/ML
3 SOLUTION/ DROPS OPHTHALMIC 4 TIMES DAILY
COMMUNITY

## 2023-01-04 RX ORDER — AMOXICILLIN AND CLAVULANATE POTASSIUM 875; 125 MG/1; MG/1
1 TABLET, FILM COATED ORAL
Status: COMPLETED | OUTPATIENT
Start: 2023-01-04 | End: 2023-01-04

## 2023-01-04 RX ORDER — CEPHALEXIN 500 MG/1
500 CAPSULE ORAL 4 TIMES DAILY
COMMUNITY

## 2023-01-04 RX ADMIN — AMOXICILLIN AND CLAVULANATE POTASSIUM 1 TABLET: 875; 125 TABLET, FILM COATED ORAL at 17:57

## 2023-01-04 RX ADMIN — DEXAMETHASONE SODIUM PHOSPHATE 10 MG: 10 INJECTION INTRAMUSCULAR; INTRAVENOUS at 17:27

## 2023-01-04 ASSESSMENT — ENCOUNTER SYMPTOMS
SHORTNESS OF BREATH: 0
VOMITING: 0
CHEST TIGHTNESS: 0
VOICE CHANGE: 0
EYE DISCHARGE: 0
PHOTOPHOBIA: 0
TROUBLE SWALLOWING: 0
EYE PAIN: 0
SORE THROAT: 1
RHINORRHEA: 0
DIARRHEA: 0
COUGH: 0
WHEEZING: 0
EYE REDNESS: 1
FACIAL SWELLING: 1
ABDOMINAL PAIN: 0
APNEA: 0

## 2023-01-04 ASSESSMENT — PAIN - FUNCTIONAL ASSESSMENT: PAIN_FUNCTIONAL_ASSESSMENT: 0-10

## 2023-01-04 ASSESSMENT — PAIN SCALES - GENERAL: PAINLEVEL_OUTOF10: 8

## 2023-01-04 NOTE — ED TRIAGE NOTES
Pt ambulatory to triage with c/o pain/swelling to his jaw from his neck to his right ear since this morning. Pt reports being seen and treated by his PCP for a corneal scratch to his right eye. Pt reports family history of lymphoma.

## 2023-01-04 NOTE — ED NOTES
I have reviewed discharge instructions with the patient and spouse. The patient and spouse verbalized understanding. Patient left ED via Discharge Method: ambulatory to Home with spouse. Opportunity for questions and clarification provided. Patient given 1 scripts. To continue your aftercare when you leave the hospital, you may receive an automated call from our care team to check in on how you are doing. This is a free service and part of our promise to provide the best care and service to meet your aftercare needs.  If you have questions, or wish to unsubscribe from this service please call [phone]. Thank you for Choosing our [facility] Emergency Department.       Thersa Hodgkin, RN  01/04/23 0492

## 2023-01-04 NOTE — ED PROVIDER NOTES
Emergency Department Provider Note                   PCP:                On File Not (Inactive)               Age: 50 y.o. Sex: male       ICD-10-CM    1. Cervical lymphadenopathy  R59.0           DISPOSITION Decision To Discharge 01/04/2023 06:21:42 PM        Medical Decision Making  5:21 PM  Suspect inflamed lymph node. Low suspicion abscess, Stevie angina, lymphoma. Blood work and COVID test.  Augmentin and steroids. 6:05 PM  In summary this is a 62-year-old male patient presenting with symptoms most consistent with lymphadenopathy on the right side in the cervical area. Based on evaluation I feel is low risk for more serious cause of diagnosis such as abscess, Stevie angina, lymphoma. Reasoning is that patient is overall well-appearing. He has noted the acute onset of right-sided neck swelling and pain with associated sore throat and fatigue which is consistent with infection. No warmth or fluctuance or drainage to suggest abscess. No fevers. No submandibular swelling or tenderness or drooling or toxic appearance to suggest Stevie angina. Lymphoma less likely given acute onset of symptoms. Lab work today stable. COVID-negative. Plan will be outpatient management. Augmentin. Outpatient follow-up with family doctor if symptoms do not resolve. Advised to follow-up here if symptoms worsen or if new symptoms develop. Counseled patient on warning signs of Stevie angina or abscess to return for specifically. Patient verbalized understanding and agreed with plan. Discharged in stable condition. Amount and/or Complexity of Data Reviewed  Labs: ordered. Risk  Prescription drug management. Complexity of Problem: 1 stable, acute illness. (3)  The patients assessment required an independent historian: I spoke with a family member. I have conducted an independent ordering and review of Labs. Considerations: Shared decision making was utilized in the care of this patient. Considerations: The following labs and/or imaging studies were considered but not ordered: US      We discussed care requested by patient that the provider declined (includes tests, admit, dc, antibiotics, etc). Patient was discharged risks and benefits of hospitalization were discussed. Orders Placed This Encounter   Procedures    COVID-19, Rapid    CMP    CBC with Auto Differential        Medications   dexamethasone (DECADRON) injection 10 mg (10 mg IntraVENous Given 1/4/23 1727)   amoxicillin-clavulanate (AUGMENTIN) 875-125 MG per tablet 1 tablet (1 tablet Oral Given 1/4/23 1757)       New Prescriptions    AMOXICILLIN-CLAVULANATE (AUGMENTIN) 875-125 MG PER TABLET    Take 1 tablet by mouth 2 times daily for 10 days        Alyse Tenorio is a 50 y.o. male who presents to the Emergency Department with chief complaint of    Chief Complaint   Patient presents with    Jaw Pain      61-year-old male patient presents today complaining of swelling under the angle of his jaw that he noticed this morning upon awakening. He reports that it is constant and is painful to swallow. Denies any relieving factors. Notes some associated fatigue and sore throat. Denies erythema, warmth, purulence, drainage, fevers. Denies submandibular swelling, difficulty swallowing, trismus, drooling, voice changes. Denies diplopia, dizziness, difficulty with gait, dysarthria. Patient is primary historian and quality is reliable. Of note, patient reports that a few days ago he hit himself in the eye with a sling shot in the right side. He reports he was seen at his ophthalmologist yesterday and diagnosed with a corneal abrasion and placed on antibiotic eyedrops. He reports he is not having any pain in the eye today. The history is provided by the patient. No  was used. Review of Systems   Constitutional:  Positive for fatigue. Negative for fever.    HENT:  Positive for facial swelling and sore throat. Negative for congestion, ear pain, hearing loss, rhinorrhea, trouble swallowing and voice change. Eyes:  Positive for redness. Negative for photophobia, pain, discharge and visual disturbance. Respiratory:  Negative for apnea, cough, chest tightness, shortness of breath and wheezing. Cardiovascular:  Negative for chest pain and palpitations. Gastrointestinal:  Negative for abdominal pain, diarrhea and vomiting. Endocrine: Negative for polydipsia, polyphagia and polyuria. Genitourinary:  Negative for decreased urine volume, dysuria, flank pain, frequency and hematuria. Musculoskeletal:  Negative for arthralgias, joint swelling, myalgias and neck stiffness. Skin:  Negative for rash and wound. Neurological:  Negative for dizziness, syncope, speech difficulty, weakness, light-headedness and headaches. Hematological:  Does not bruise/bleed easily. Psychiatric/Behavioral:  Negative for self-injury and suicidal ideas. All other systems reviewed and are negative. Past Medical History:   Diagnosis Date    H/O blood clots     Hypertension     Restless leg syndrome     Sleep apnea         Past Surgical History:   Procedure Laterality Date    FRACTURE SURGERY      right shoulder        History reviewed. No pertinent family history. Social History     Socioeconomic History    Marital status: Single     Spouse name: None    Number of children: None    Years of education: None    Highest education level: None   Tobacco Use    Smoking status: Every Day     Packs/day: 0.50     Types: Cigarettes    Smokeless tobacco: Never   Vaping Use    Vaping Use: Never used   Substance and Sexual Activity    Alcohol use: Yes    Drug use: Never         Patient has no known allergies.      Previous Medications    CEPHALEXIN (KEFLEX) 500 MG CAPSULE    Take 500 mg by mouth 4 times daily    DICLOFENAC (VOLTAREN) 75 MG EC TABLET    Take 1 tablet by mouth 2 times daily    GABAPENTIN, ONCE-DAILY, 300 MG TABS Gabapentin 300 MG TABS  TAKE 1 TABLET TWICE DAILY. Refills: 0    Active    OFLOXACIN (OCUFLOX) 0.3 % SOLUTION    3 drops 4 times daily    VENLAFAXINE (EFFEXOR XR) 150 MG EXTENDED RELEASE CAPSULE    Effexor  MG Oral Capsule Extended Release 24 Hour  take 1 cap q 12 hours per [facility] med form   Refills: 0    Active        Vitals signs and nursing note reviewed. No data found. Physical Exam  Vitals and nursing note reviewed. Constitutional:       General: He is not in acute distress. Appearance: Normal appearance. He is obese. He is not ill-appearing, toxic-appearing or diaphoretic. Comments: Overall well-appearing and in no acute distress. Resting comfortably in stretcher. Alert and oriented x4. Cooperative. Pleasant. No drooling. No tripoding. Even nonlabored respirations. HENT:      Head: Normocephalic and atraumatic. Right Ear: Tympanic membrane, ear canal and external ear normal. There is no impacted cerumen. No mastoid tenderness. Left Ear: Tympanic membrane, ear canal and external ear normal. There is no impacted cerumen. No mastoid tenderness. Ears:      Comments: No signs of otitis media/externa. No mastoid tenderness or swelling bilaterally     Nose: Nose normal. No congestion or rhinorrhea. Mouth/Throat:      Lips: Pink. No lesions. Mouth: Mucous membranes are moist. No oral lesions or angioedema. Dentition: No dental abscesses. Tongue: No lesions. Tongue does not deviate from midline. Palate: No mass and lesions. Pharynx: Uvula midline. Posterior oropharyngeal erythema present. No oropharyngeal exudate or uvula swelling. Tonsils: No tonsillar exudate or tonsillar abscesses. Comments: Mild posterior oropharyngeal erythema. Uvula midline. No signs of peritonsillar abscess. No submandibular swelling or tenderness. No brawny texture to floor of mouth. No obvious signs of dental abscess. No signs of trismus. Handling secretions well without drooling. No tripoding. Speaks in full sentences. No hot potato voice. Eyes:      General:         Right eye: No discharge. Left eye: No discharge. Extraocular Movements: Extraocular movements intact. Pupils: Pupils are equal, round, and reactive to light. Comments: Right eye sclera injected. No obvious hyphema. Vision grossly baseline for patient. No purulent drainage. No proptosis or ophthalmoplegia. Full EOMs. No diplopia or entrapment. Neck:      Thyroid: No thyroid mass. Trachea: Trachea and phonation normal. No tracheal tenderness. Meningeal: Brudzinski's sign and Kernig's sign absent. Comments: There is right-sided lymphadenopathy under angle of jaw. It is tender to palpation. No erythema or warmth or purulence or drainage or signs of abscess or overlying cellulitis. Rubbery and mobile and most consistent with lymph node. Cardiovascular:      Rate and Rhythm: Normal rate and regular rhythm. Pulses: Normal pulses. Heart sounds: Normal heart sounds. No murmur heard. Pulmonary:      Effort: Pulmonary effort is normal. No respiratory distress. Breath sounds: Normal breath sounds. No wheezing, rhonchi or rales. Abdominal:      General: Abdomen is flat. Bowel sounds are normal.      Palpations: Abdomen is soft. Tenderness: There is no abdominal tenderness. There is no guarding or rebound. Musculoskeletal:      Cervical back: Normal range of motion and neck supple. No rigidity or tenderness. Normal range of motion. Lymphadenopathy:      Cervical: Cervical adenopathy present. Right cervical: Superficial cervical adenopathy present. No deep or posterior cervical adenopathy. Left cervical: No superficial, deep or posterior cervical adenopathy. Skin:     General: Skin is warm and dry. Capillary Refill: Capillary refill takes less than 2 seconds. Findings: No erythema or rash. Neurological:      General: No focal deficit present. Mental Status: He is alert and oriented to person, place, and time. Mental status is at baseline.         Procedures    Results for orders placed or performed during the hospital encounter of 01/04/23   COVID-19, Rapid    Specimen: Nasopharyngeal   Result Value Ref Range    Source Nasopharyngeal      SARS-CoV-2, Rapid Not detected NOTD     CMP   Result Value Ref Range    Sodium 137 133 - 143 mmol/L    Potassium 3.6 3.5 - 5.1 mmol/L    Chloride 102 101 - 110 mmol/L    CO2 28 21 - 32 mmol/L    Anion Gap 7 2 - 11 mmol/L    Glucose 180 (H) 65 - 100 mg/dL    BUN 13 6 - 23 MG/DL    Creatinine 1.09 0.8 - 1.5 MG/DL    Est, Glom Filt Rate >60 >60 ml/min/1.73m2    Calcium 9.6 8.3 - 10.4 MG/DL    Total Bilirubin 0.4 0.2 - 1.1 MG/DL    ALT 37 12 - 65 U/L    AST 25 15 - 37 U/L    Alk Phosphatase 96 50 - 136 U/L    Total Protein 7.7 6.3 - 8.2 g/dL    Albumin 3.9 3.5 - 5.0 g/dL    Globulin 3.8 2.8 - 4.5 g/dL    Albumin/Globulin Ratio 1.0 0.4 - 1.6     CBC with Auto Differential   Result Value Ref Range    WBC 11.5 (H) 4.3 - 11.1 K/uL    RBC 5.49 4.23 - 5.6 M/uL    Hemoglobin 16.5 13.6 - 17.2 g/dL    Hematocrit 49.4 41.1 - 50.3 %    MCV 90.0 82.0 - 102.0 FL    MCH 30.1 26.1 - 32.9 PG    MCHC 33.4 31.4 - 35.0 g/dL    RDW 13.0 11.9 - 14.6 %    Platelets 834 485 - 106 K/uL    MPV 8.9 (L) 9.4 - 12.3 FL    nRBC 0.00 0.0 - 0.2 K/uL    Differential Type AUTOMATED      Seg Neutrophils 69 43 - 78 %    Lymphocytes 24 13 - 44 %    Monocytes 5 4.0 - 12.0 %    Eosinophils % 2 0.5 - 7.8 %    Basophils 1 0.0 - 2.0 %    Immature Granulocytes 0 0.0 - 5.0 %    Segs Absolute 7.9 1.7 - 8.2 K/UL    Absolute Lymph # 2.7 0.5 - 4.6 K/UL    Absolute Mono # 0.6 0.1 - 1.3 K/UL    Absolute Eos # 0.2 0.0 - 0.8 K/UL    Basophils Absolute 0.1 0.0 - 0.2 K/UL    Absolute Immature Granulocyte 0.0 0.0 - 0.5 K/UL        No orders to display                       Voice dictation software was used during the making of this note. This software is not perfect and grammatical and other typographical errors may be present. This note has not been completely proofread for errors.      Mónica Marinelli  01/04/23 1827

## 2023-01-04 NOTE — DISCHARGE INSTRUCTIONS
Your lab work today was stable. Your COVID test was negative. We will treat you for suspected infection with 10 days of Augmentin. Please take the antibiotics for the full course and do not stop taking them even if your symptoms improve sooner than 10 days. If your symptoms persist, please follow-up with family doctor as you may require ultrasound. If your symptoms worsen or change, please return here for reevaluation. As we discussed, I did not find a life threatening cause of your symptoms today. However, THAT DOES NOT MEAN IT COULD NOT DEVELOP. If you develop ANY new or worsening symptoms, it is critical that you return for re-evaluation. This includes any symptoms that are concerning to you, especially symptoms such as fevers, drooling, difficulty swallowing, drainage, redness. If you do not return for re-evaluation, you risk serious complications, including death.

## 2023-01-13 ENCOUNTER — CLINICAL DOCUMENTATION (OUTPATIENT)
Dept: ORTHOPEDIC SURGERY | Age: 53
End: 2023-01-13

## 2023-01-13 NOTE — PROGRESS NOTES
MAILED  FORM AND INVOICE TO [employer] [address] [place]. [address] [address]

## 2023-01-17 ENCOUNTER — OFFICE VISIT (OUTPATIENT)
Dept: ORTHOPEDIC SURGERY | Age: 53
End: 2023-01-17

## 2023-01-17 DIAGNOSIS — S83.241D ACUTE MEDIAL MENISCUS TEAR OF RIGHT KNEE, SUBSEQUENT ENCOUNTER: Primary | ICD-10-CM

## 2023-01-17 NOTE — PROGRESS NOTES
Name: Rivas Robledo  YOB: 1970  Gender: male  MRN: [id number]      CC: Knee Pain (R Knee )       HPI: Rivas Robledo is a 50 y.o. male who returns for follow up on 1/17/2023. He states his pain has gotten worse since his last visit. Physical Examination:  General: no acute distress  Lungs: breathing easily  CV: regular rhythm by pulse  Right Knee: Tenderness to palpation of the medial joint line. Pain at the extremes of motion over the medial joint line pain with Mirlande's testing and bounce home testing over the medial joint line with recreation of symptoms. Assessment:     ICD-10-CM    1. Acute medial meniscus tear of right knee, subsequent encounter  S83.241D Ambulatory referral to Orthopedic Surgery          Plan: We reviewed again his significant medial meniscus tear with likely root avulsion. We discussed as we have previously my recommendation for surgical intervention. He reports again this was an acute injury and he had no significant knee pain prior to this. He is ready to proceed with surgery. We discussed again the detail. Benefits of this to include knee arthroscopy with meniscus repair versus debridement. We discussed the specifics of postoperative recovery with the meniscal root repair including protected weightbearing likely x6 weeks total recovery of likely 6 months. All of his questions have been answered he would like to proceed as planned. We can see him back for preoperative appointment. His work status was updated today please see the form included in the chart for appropriate restrictions. Charo Lawson MD, [facility] and [facility]

## 2023-01-18 DIAGNOSIS — S83.241D ACUTE MEDIAL MENISCUS TEAR OF RIGHT KNEE, SUBSEQUENT ENCOUNTER: Primary | ICD-10-CM

## 2023-01-18 DIAGNOSIS — M25.561 RIGHT KNEE PAIN, UNSPECIFIED CHRONICITY: ICD-10-CM

## 2023-01-18 PROBLEM — S83.241A ACUTE MEDIAL MENISCUS TEAR OF RIGHT KNEE: Status: ACTIVE | Noted: 2023-01-18

## 2023-01-20 ENCOUNTER — TELEPHONE (OUTPATIENT)
Dept: ORTHOPEDIC SURGERY | Age: 53
End: 2023-01-20

## 2023-01-20 NOTE — TELEPHONE ENCOUNTER
Pt called and said it is very urgent that Vijay Chowdhury call him about his right knee  [phone]

## 2023-02-01 ENCOUNTER — TELEPHONE (OUTPATIENT)
Dept: ORTHOPEDIC SURGERY | Age: 53
End: 2023-02-01

## 2023-02-01 NOTE — TELEPHONE ENCOUNTER
Called [employer] [employer] [facility] to discuss their inquiry into his plan of care and was unable to reach Eleanor Roy. Left a message with her  to call me back when she can.

## 2023-02-08 NOTE — TELEPHONE ENCOUNTER
Spoke with someone from the law firm last week to discuss the form she needed signed for Mr. Richy Nance. I told her we had no record of that form and she offered to send it again. Still have not received.

## 2024-02-26 ENCOUNTER — APPOINTMENT (OUTPATIENT)
Dept: CT IMAGING | Age: 54
End: 2024-02-26

## 2024-02-26 ENCOUNTER — HOSPITAL ENCOUNTER (EMERGENCY)
Age: 54
Discharge: HOME | End: 2024-02-27
Attending: EMERGENCY MEDICINE

## 2024-02-26 DIAGNOSIS — L03.115 CELLULITIS OF RIGHT LOWER EXTREMITY: Primary | ICD-10-CM

## 2024-02-26 PROCEDURE — 99285 EMERGENCY DEPT VISIT HI MDM: CPT

## 2024-02-26 RX ORDER — IBUPROFEN 400 MG/1
400 TABLET ORAL
Status: COMPLETED | OUTPATIENT
Start: 2024-02-26 | End: 2024-02-27

## 2024-02-26 RX ORDER — HYDROXYZINE HYDROCHLORIDE 25 MG/1
25 TABLET, FILM COATED ORAL
Status: COMPLETED | OUTPATIENT
Start: 2024-02-26 | End: 2024-02-27

## 2024-02-26 ASSESSMENT — PAIN SCALES - GENERAL: PAINLEVEL_OUTOF10: 8

## 2024-02-26 ASSESSMENT — LIFESTYLE VARIABLES
HOW OFTEN DO YOU HAVE A DRINK CONTAINING ALCOHOL: NEVER
HOW MANY STANDARD DRINKS CONTAINING ALCOHOL DO YOU HAVE ON A TYPICAL DAY: PATIENT DOES NOT DRINK

## 2024-02-26 ASSESSMENT — PAIN DESCRIPTION - DIRECTION: RADIATING_TOWARDS: DENIES

## 2024-02-26 ASSESSMENT — PAIN DESCRIPTION - PAIN TYPE: TYPE: ACUTE PAIN

## 2024-02-26 ASSESSMENT — PAIN DESCRIPTION - ORIENTATION: ORIENTATION: RIGHT;LOWER

## 2024-02-26 ASSESSMENT — PAIN DESCRIPTION - FREQUENCY: FREQUENCY: CONTINUOUS

## 2024-02-26 ASSESSMENT — PAIN - FUNCTIONAL ASSESSMENT: PAIN_FUNCTIONAL_ASSESSMENT: 0-10

## 2024-02-26 ASSESSMENT — PAIN DESCRIPTION - LOCATION: LOCATION: LEG

## 2024-02-27 ENCOUNTER — APPOINTMENT (OUTPATIENT)
Dept: ULTRASOUND IMAGING | Age: 54
End: 2024-02-27

## 2024-02-27 ENCOUNTER — APPOINTMENT (OUTPATIENT)
Dept: CT IMAGING | Age: 54
End: 2024-02-27

## 2024-02-27 VITALS
HEIGHT: 67 IN | TEMPERATURE: 98.5 F | WEIGHT: 293 LBS | OXYGEN SATURATION: 95 % | HEART RATE: 90 BPM | BODY MASS INDEX: 45.99 KG/M2 | DIASTOLIC BLOOD PRESSURE: 68 MMHG | SYSTOLIC BLOOD PRESSURE: 128 MMHG | RESPIRATION RATE: 23 BRPM

## 2024-02-27 LAB
ALBUMIN SERPL-MCNC: 3.7 G/DL (ref 3.5–5)
ALBUMIN/GLOB SERPL: 1 (ref 0.4–1.6)
ALP SERPL-CCNC: 116 U/L (ref 50–136)
ALT SERPL-CCNC: 28 U/L (ref 12–65)
ANION GAP SERPL CALC-SCNC: 5 MMOL/L (ref 2–11)
AST SERPL-CCNC: 25 U/L (ref 15–37)
BASOPHILS # BLD: 0.1 K/UL (ref 0–0.2)
BASOPHILS NFR BLD: 1 % (ref 0–2)
BILIRUB SERPL-MCNC: 0.4 MG/DL (ref 0.2–1.1)
BUN SERPL-MCNC: 9 MG/DL (ref 6–23)
CALCIUM SERPL-MCNC: 8.9 MG/DL (ref 8.3–10.4)
CHLORIDE SERPL-SCNC: 106 MMOL/L (ref 103–113)
CO2 SERPL-SCNC: 28 MMOL/L (ref 21–32)
CREAT SERPL-MCNC: 1 MG/DL (ref 0.8–1.5)
DIFFERENTIAL METHOD BLD: ABNORMAL
EKG ATRIAL RATE: 88 BPM
EKG DIAGNOSIS: NORMAL
EKG P AXIS: 59 DEGREES
EKG P-R INTERVAL: 153 MS
EKG Q-T INTERVAL: 352 MS
EKG QRS DURATION: 95 MS
EKG QTC CALCULATION (BAZETT): 429 MS
EKG R AXIS: 40 DEGREES
EKG T AXIS: 51 DEGREES
EKG VENTRICULAR RATE: 89 BPM
EOSINOPHIL # BLD: 0.4 K/UL (ref 0–0.8)
EOSINOPHIL NFR BLD: 3 % (ref 0.5–7.8)
ERYTHROCYTE [DISTWIDTH] IN BLOOD BY AUTOMATED COUNT: 13.6 % (ref 11.9–14.6)
GLOBULIN SER CALC-MCNC: 3.6 G/DL (ref 2.8–4.5)
GLUCOSE SERPL-MCNC: 103 MG/DL (ref 65–100)
HCT VFR BLD AUTO: 43.2 % (ref 41.1–50.3)
HGB BLD-MCNC: 14.6 G/DL (ref 13.6–17.2)
IMM GRANULOCYTES # BLD AUTO: 0.1 K/UL (ref 0–0.5)
IMM GRANULOCYTES NFR BLD AUTO: 1 % (ref 0–5)
LYMPHOCYTES # BLD: 3.4 K/UL (ref 0.5–4.6)
LYMPHOCYTES NFR BLD: 25 % (ref 13–44)
MCH RBC QN AUTO: 30.4 PG (ref 26.1–32.9)
MCHC RBC AUTO-ENTMCNC: 33.8 G/DL (ref 31.4–35)
MCV RBC AUTO: 89.8 FL (ref 82–102)
MONOCYTES # BLD: 1.6 K/UL (ref 0.1–1.3)
MONOCYTES NFR BLD: 12 % (ref 4–12)
NEUTS SEG # BLD: 8.2 K/UL (ref 1.7–8.2)
NEUTS SEG NFR BLD: 60 % (ref 43–78)
NRBC # BLD: 0 K/UL (ref 0–0.2)
NT PRO BNP: 26 PG/ML (ref 5–125)
PLATELET # BLD AUTO: 299 K/UL (ref 150–450)
PMV BLD AUTO: 8.9 FL (ref 9.4–12.3)
POTASSIUM SERPL-SCNC: 3.8 MMOL/L (ref 3.5–5.1)
PROCALCITONIN SERPL-MCNC: 0.12 NG/ML (ref 0–0.49)
PROT SERPL-MCNC: 7.3 G/DL (ref 6.3–8.2)
RBC # BLD AUTO: 4.81 M/UL (ref 4.23–5.6)
SODIUM SERPL-SCNC: 139 MMOL/L (ref 136–146)
TROPONIN I SERPL HS-MCNC: 6.9 PG/ML (ref 0–14)
WBC # BLD AUTO: 13.9 K/UL (ref 4.3–11.1)

## 2024-02-27 PROCEDURE — 80053 COMPREHEN METABOLIC PANEL: CPT

## 2024-02-27 PROCEDURE — 71260 CT THORAX DX C+: CPT

## 2024-02-27 PROCEDURE — 93971 EXTREMITY STUDY: CPT

## 2024-02-27 PROCEDURE — 6360000004 HC RX CONTRAST MEDICATION: Performed by: EMERGENCY MEDICINE

## 2024-02-27 PROCEDURE — 83880 ASSAY OF NATRIURETIC PEPTIDE: CPT

## 2024-02-27 PROCEDURE — 84484 ASSAY OF TROPONIN QUANT: CPT

## 2024-02-27 PROCEDURE — 85025 COMPLETE CBC W/AUTO DIFF WBC: CPT

## 2024-02-27 PROCEDURE — 84145 PROCALCITONIN (PCT): CPT

## 2024-02-27 PROCEDURE — 6370000000 HC RX 637 (ALT 250 FOR IP): Performed by: EMERGENCY MEDICINE

## 2024-02-27 PROCEDURE — 93005 ELECTROCARDIOGRAM TRACING: CPT | Performed by: EMERGENCY MEDICINE

## 2024-02-27 RX ORDER — SULFAMETHOXAZOLE AND TRIMETHOPRIM 800; 160 MG/1; MG/1
1 TABLET ORAL 2 TIMES DAILY
Qty: 20 TABLET | Refills: 0 | Status: ON HOLD | OUTPATIENT
Start: 2024-02-27 | End: 2024-03-03 | Stop reason: HOSPADM

## 2024-02-27 RX ORDER — NAPROXEN 500 MG/1
500 TABLET ORAL 2 TIMES DAILY WITH MEALS
Qty: 40 TABLET | Refills: 0 | Status: ON HOLD | OUTPATIENT
Start: 2024-02-27 | End: 2024-03-03 | Stop reason: HOSPADM

## 2024-02-27 RX ORDER — ACETAMINOPHEN AND CODEINE PHOSPHATE 300; 30 MG/1; MG/1
1 TABLET ORAL EVERY 6 HOURS PRN
Qty: 20 TABLET | Refills: 0 | Status: SHIPPED | OUTPATIENT
Start: 2024-02-27 | End: 2024-03-03

## 2024-02-27 RX ADMIN — IBUPROFEN 400 MG: 400 TABLET, FILM COATED ORAL at 00:30

## 2024-02-27 RX ADMIN — HYDROXYZINE HYDROCHLORIDE 25 MG: 25 TABLET, FILM COATED ORAL at 00:30

## 2024-02-27 RX ADMIN — IOPAMIDOL 100 ML: 755 INJECTION, SOLUTION INTRAVENOUS at 01:39

## 2024-02-27 ASSESSMENT — PAIN SCALES - GENERAL: PAINLEVEL_OUTOF10: 8

## 2024-02-27 NOTE — ED NOTES
I have reviewed discharge instructions with the patient.  The patient verbalized understanding.    Patient left ED via Discharge Method: ambulatory to Home with self.    Opportunity for questions and clarification provided.       Patient given 4 scripts.         To continue your aftercare when you leave the hospital, you may receive an automated call from our care team to check in on how you are doing.  This is a free service and part of our promise to provide the best care and service to meet your aftercare needs.” If you have questions, or wish to unsubscribe from this service please call [phone].  Thank you for Choosing our [facility] Emergency Department.        Neli Bernard, RN  02/27/24 2574

## 2024-02-27 NOTE — ED TRIAGE NOTES
Pt arrives to ED via POV with wife c/o right lower leg redness and rash. Cut right lower extremity on a broken plastic bin in October of 2022 and never completely healed up, then 2 weeks ago noted redness, yesterday and today noted swelling and weeping from right lateral shin. Pt reports right knee scope in July of 2023. Denies DM, denies hx of MRSA infection

## 2024-02-27 NOTE — ED PROVIDER NOTES
Prominent right inguinal lymph node noted.      Impression    No evidence of deep venous thrombosis in the right lower extremity           Vascular duplex lower extremity venous right   Final Result   No evidence of deep venous thrombosis in the right lower extremity         CT CHEST PULMONARY EMBOLISM W CONTRAST   Final Result   1.  No CT evidence of acute pulmonary embolus.   2.  No acute findings within the chest.                         No results for input(s): \"COVID19\" in the last 72 hours.    Voice dictation software was used during the making of this note.  This software is not perfect and grammatical and other typographical errors may be present.  This note has not been completely proofread for errors.       Kulwinder Durant, DO  02/27/24 0952

## 2024-02-27 NOTE — DISCHARGE INSTRUCTIONS
Take the full course of antibiotics as prescribed.  Use the Bactroban 3 times daily over the affected area  You need to follow-up with wound care or establish care with a primary doctor for close outpatient follow-up for your nonhealing wound  If you begin developing any new or concerning symptoms please return to the emergency department at that time    We would love to help you get a primary care doctor for follow-up after your emergency department visit.    Please call [phone] between 7AM - 6PM Monday to Friday.  A care navigator will be able to assist you with setting up a doctor close to your home.       Take the least amount of narcotic pain medicine possible for control of severe pain.  Risk of opioid analgesics include, but are not limited to: Overdose they can stop or slow your breathing and lead to death; fractures from falls; drowsiness leading to injury; tolerance, dependence and addiction. You should not operate any motorized vehicles or work from a height greater than ground level when taking opioid analgesics as they increase your fall risks.

## 2024-02-29 ENCOUNTER — HOSPITAL ENCOUNTER (INPATIENT)
Age: 54
LOS: 3 days | Discharge: HOME | DRG: 603 | End: 2024-03-03
Attending: EMERGENCY MEDICINE | Admitting: FAMILY MEDICINE
Payer: COMMERCIAL

## 2024-02-29 DIAGNOSIS — L03.115 CELLULITIS OF RIGHT LOWER EXTREMITY: ICD-10-CM

## 2024-02-29 DIAGNOSIS — L03.115 CELLULITIS OF RIGHT LEG: Primary | ICD-10-CM

## 2024-02-29 LAB
ANION GAP SERPL CALC-SCNC: 5 MMOL/L (ref 2–11)
BASOPHILS # BLD: 0.1 K/UL (ref 0–0.2)
BASOPHILS NFR BLD: 1 % (ref 0–2)
BUN SERPL-MCNC: 9 MG/DL (ref 6–23)
CALCIUM SERPL-MCNC: 9.2 MG/DL (ref 8.3–10.4)
CHLORIDE SERPL-SCNC: 107 MMOL/L (ref 103–113)
CO2 SERPL-SCNC: 28 MMOL/L (ref 21–32)
CREAT SERPL-MCNC: 1 MG/DL (ref 0.8–1.5)
DIFFERENTIAL METHOD BLD: ABNORMAL
EOSINOPHIL # BLD: 0.3 K/UL (ref 0–0.8)
EOSINOPHIL NFR BLD: 4 % (ref 0.5–7.8)
ERYTHROCYTE [DISTWIDTH] IN BLOOD BY AUTOMATED COUNT: 13.5 % (ref 11.9–14.6)
EST. AVERAGE GLUCOSE BLD GHB EST-MCNC: 123 MG/DL
GLUCOSE SERPL-MCNC: 147 MG/DL (ref 65–100)
HBA1C MFR BLD: 5.9 % (ref 4.8–5.6)
HCT VFR BLD AUTO: 44.7 % (ref 41.1–50.3)
HGB BLD-MCNC: 15 G/DL (ref 13.6–17.2)
IMM GRANULOCYTES # BLD AUTO: 0 K/UL (ref 0–0.5)
IMM GRANULOCYTES NFR BLD AUTO: 0 % (ref 0–5)
LACTATE SERPL-SCNC: 1.4 MMOL/L (ref 0.4–2)
LACTATE SERPL-SCNC: 2.6 MMOL/L (ref 0.4–2)
LYMPHOCYTES # BLD: 2.2 K/UL (ref 0.5–4.6)
LYMPHOCYTES NFR BLD: 28 % (ref 13–44)
MCH RBC QN AUTO: 30.1 PG (ref 26.1–32.9)
MCHC RBC AUTO-ENTMCNC: 33.6 G/DL (ref 31.4–35)
MCV RBC AUTO: 89.8 FL (ref 82–102)
MONOCYTES # BLD: 0.6 K/UL (ref 0.1–1.3)
MONOCYTES NFR BLD: 8 % (ref 4–12)
NEUTS SEG # BLD: 4.7 K/UL (ref 1.7–8.2)
NEUTS SEG NFR BLD: 59 % (ref 43–78)
NRBC # BLD: 0 K/UL (ref 0–0.2)
PLATELET # BLD AUTO: 300 K/UL (ref 150–450)
PMV BLD AUTO: 8.8 FL (ref 9.4–12.3)
POTASSIUM SERPL-SCNC: 3.7 MMOL/L (ref 3.5–5.1)
PROCALCITONIN SERPL-MCNC: <0.05 NG/ML (ref 0–0.49)
RBC # BLD AUTO: 4.98 M/UL (ref 4.23–5.6)
SODIUM SERPL-SCNC: 140 MMOL/L (ref 136–146)
WBC # BLD AUTO: 8 K/UL (ref 4.3–11.1)

## 2024-02-29 PROCEDURE — 99285 EMERGENCY DEPT VISIT HI MDM: CPT

## 2024-02-29 PROCEDURE — 6370000000 HC RX 637 (ALT 250 FOR IP): Performed by: FAMILY MEDICINE

## 2024-02-29 PROCEDURE — 83605 ASSAY OF LACTIC ACID: CPT

## 2024-02-29 PROCEDURE — 87040 BLOOD CULTURE FOR BACTERIA: CPT

## 2024-02-29 PROCEDURE — 85025 COMPLETE CBC W/AUTO DIFF WBC: CPT

## 2024-02-29 PROCEDURE — 2580000003 HC RX 258: Performed by: FAMILY MEDICINE

## 2024-02-29 PROCEDURE — 94760 N-INVAS EAR/PLS OXIMETRY 1: CPT

## 2024-02-29 PROCEDURE — 36415 COLL VENOUS BLD VENIPUNCTURE: CPT

## 2024-02-29 PROCEDURE — 80048 BASIC METABOLIC PNL TOTAL CA: CPT

## 2024-02-29 PROCEDURE — 6360000002 HC RX W HCPCS: Performed by: FAMILY MEDICINE

## 2024-02-29 PROCEDURE — 96374 THER/PROPH/DIAG INJ IV PUSH: CPT

## 2024-02-29 PROCEDURE — 2580000003 HC RX 258: Performed by: NURSE PRACTITIONER

## 2024-02-29 PROCEDURE — 84145 PROCALCITONIN (PCT): CPT

## 2024-02-29 PROCEDURE — 6360000002 HC RX W HCPCS: Performed by: NURSE PRACTITIONER

## 2024-02-29 PROCEDURE — 1100000000 HC RM PRIVATE

## 2024-02-29 PROCEDURE — 83036 HEMOGLOBIN GLYCOSYLATED A1C: CPT

## 2024-02-29 RX ORDER — FAMOTIDINE 20 MG/1
10 TABLET, FILM COATED ORAL DAILY PRN
Status: DISCONTINUED | OUTPATIENT
Start: 2024-02-29 | End: 2024-03-03 | Stop reason: HOSPADM

## 2024-02-29 RX ORDER — VENLAFAXINE HYDROCHLORIDE 75 MG/1
150 CAPSULE, EXTENDED RELEASE ORAL DAILY
Status: DISCONTINUED | OUTPATIENT
Start: 2024-03-01 | End: 2024-03-03 | Stop reason: HOSPADM

## 2024-02-29 RX ORDER — SODIUM CHLORIDE 0.9 % (FLUSH) 0.9 %
5-40 SYRINGE (ML) INJECTION PRN
Status: DISCONTINUED | OUTPATIENT
Start: 2024-02-29 | End: 2024-03-03 | Stop reason: HOSPADM

## 2024-02-29 RX ORDER — BISACODYL 10 MG
10 SUPPOSITORY, RECTAL RECTAL DAILY PRN
Status: DISCONTINUED | OUTPATIENT
Start: 2024-02-29 | End: 2024-03-03 | Stop reason: HOSPADM

## 2024-02-29 RX ORDER — HYDROXYZINE HYDROCHLORIDE 25 MG/1
50 TABLET, FILM COATED ORAL 3 TIMES DAILY PRN
Status: DISCONTINUED | OUTPATIENT
Start: 2024-02-29 | End: 2024-03-03 | Stop reason: HOSPADM

## 2024-02-29 RX ORDER — POTASSIUM CHLORIDE 7.45 MG/ML
10 INJECTION INTRAVENOUS PRN
Status: DISCONTINUED | OUTPATIENT
Start: 2024-02-29 | End: 2024-03-02

## 2024-02-29 RX ORDER — OXYCODONE HYDROCHLORIDE 5 MG/1
5 TABLET ORAL EVERY 4 HOURS PRN
Status: DISCONTINUED | OUTPATIENT
Start: 2024-02-29 | End: 2024-03-03 | Stop reason: HOSPADM

## 2024-02-29 RX ORDER — SODIUM CHLORIDE 0.9 % (FLUSH) 0.9 %
5-40 SYRINGE (ML) INJECTION EVERY 12 HOURS SCHEDULED
Status: DISCONTINUED | OUTPATIENT
Start: 2024-02-29 | End: 2024-03-03 | Stop reason: HOSPADM

## 2024-02-29 RX ORDER — ENOXAPARIN SODIUM 100 MG/ML
30 INJECTION SUBCUTANEOUS 2 TIMES DAILY
Status: DISCONTINUED | OUTPATIENT
Start: 2024-02-29 | End: 2024-03-03 | Stop reason: HOSPADM

## 2024-02-29 RX ORDER — MAGNESIUM HYDROXIDE/ALUMINUM HYDROXICE/SIMETHICONE 120; 1200; 1200 MG/30ML; MG/30ML; MG/30ML
30 SUSPENSION ORAL EVERY 6 HOURS PRN
Status: DISCONTINUED | OUTPATIENT
Start: 2024-02-29 | End: 2024-03-03 | Stop reason: HOSPADM

## 2024-02-29 RX ORDER — MAGNESIUM SULFATE IN WATER 40 MG/ML
2000 INJECTION, SOLUTION INTRAVENOUS PRN
Status: DISCONTINUED | OUTPATIENT
Start: 2024-02-29 | End: 2024-03-02

## 2024-02-29 RX ORDER — ACETAMINOPHEN 325 MG/1
650 TABLET ORAL EVERY 6 HOURS PRN
Status: DISCONTINUED | OUTPATIENT
Start: 2024-02-29 | End: 2024-03-03 | Stop reason: HOSPADM

## 2024-02-29 RX ORDER — POTASSIUM CHLORIDE 20 MEQ/1
40 TABLET, EXTENDED RELEASE ORAL PRN
Status: DISCONTINUED | OUTPATIENT
Start: 2024-02-29 | End: 2024-03-02

## 2024-02-29 RX ORDER — POLYETHYLENE GLYCOL 3350 17 G/17G
17 POWDER, FOR SOLUTION ORAL DAILY PRN
Status: DISCONTINUED | OUTPATIENT
Start: 2024-02-29 | End: 2024-03-03 | Stop reason: HOSPADM

## 2024-02-29 RX ORDER — SODIUM CHLORIDE 9 MG/ML
INJECTION, SOLUTION INTRAVENOUS PRN
Status: DISCONTINUED | OUTPATIENT
Start: 2024-02-29 | End: 2024-03-03 | Stop reason: HOSPADM

## 2024-02-29 RX ORDER — ACETAMINOPHEN 650 MG/1
650 SUPPOSITORY RECTAL EVERY 6 HOURS PRN
Status: DISCONTINUED | OUTPATIENT
Start: 2024-02-29 | End: 2024-03-03 | Stop reason: HOSPADM

## 2024-02-29 RX ORDER — 0.9 % SODIUM CHLORIDE 0.9 %
1000 INTRAVENOUS SOLUTION INTRAVENOUS
Status: COMPLETED | OUTPATIENT
Start: 2024-02-29 | End: 2024-02-29

## 2024-02-29 RX ADMIN — HYDROXYZINE HYDROCHLORIDE 50 MG: 25 TABLET, FILM COATED ORAL at 22:59

## 2024-02-29 RX ADMIN — SODIUM CHLORIDE, PRESERVATIVE FREE 10 ML: 5 INJECTION INTRAVENOUS at 22:40

## 2024-02-29 RX ADMIN — SODIUM CHLORIDE: 9 INJECTION, SOLUTION INTRAVENOUS at 16:06

## 2024-02-29 RX ADMIN — ENOXAPARIN SODIUM 30 MG: 100 INJECTION SUBCUTANEOUS at 22:35

## 2024-02-29 RX ADMIN — SODIUM CHLORIDE 1000 ML: 9 INJECTION, SOLUTION INTRAVENOUS at 13:32

## 2024-02-29 RX ADMIN — Medication 2500 MG: at 16:46

## 2024-02-29 RX ADMIN — PIPERACILLIN AND TAZOBACTAM 4500 MG: 4; .5 INJECTION, POWDER, FOR SOLUTION INTRAVENOUS at 16:07

## 2024-02-29 RX ADMIN — WATER 1000 MG: 1 INJECTION INTRAMUSCULAR; INTRAVENOUS; SUBCUTANEOUS at 12:31

## 2024-02-29 RX ADMIN — PIPERACILLIN AND TAZOBACTAM 4500 MG: 4; .5 INJECTION, POWDER, FOR SOLUTION INTRAVENOUS at 22:39

## 2024-02-29 ASSESSMENT — PAIN DESCRIPTION - LOCATION: LOCATION: LEG

## 2024-02-29 ASSESSMENT — PAIN SCALES - GENERAL
PAINLEVEL_OUTOF10: 0
PAINLEVEL_OUTOF10: 8
PAINLEVEL_OUTOF10: 0

## 2024-02-29 ASSESSMENT — PAIN DESCRIPTION - ORIENTATION: ORIENTATION: RIGHT

## 2024-02-29 ASSESSMENT — LIFESTYLE VARIABLES
HOW MANY STANDARD DRINKS CONTAINING ALCOHOL DO YOU HAVE ON A TYPICAL DAY: PATIENT DOES NOT DRINK
HOW OFTEN DO YOU HAVE A DRINK CONTAINING ALCOHOL: NEVER

## 2024-02-29 NOTE — ED TRIAGE NOTES
Patient a 53 y/o Male reports to the ED with c/c of right leg swelling. Was diagnosed with a staph infection on Monday and placed on antibiotics . States his swelling is getting worse and migrating up to his right thigh. Has some weeping. Painful to the touch and feels numb when standing.

## 2024-02-29 NOTE — H&P
Hospitalist History and Physical   Admit Date:  2024 11:37 AM   Name:  Josué Pruitt   Age:  54 y.o.  Sex:  male  :  1970   MRN:  [id number]   Room:  ER14/14    Presenting/Chief Complaint: Leg Swelling (/)     Reason(s) for Admission: Cellulitis of right leg [L03.115]     History of Present Illness:   Josué Pruitt is a 54 y.o. male who presented to the ED for cc worsening right leg cellulitis despite being started on Bactrim two days ago at our ED. No noted trauma to area. Nothing seems to make better or worse.     Hx of anxiety, STEVIE, pulmonary emboli no longer on anticoagulation     Lactic acid 2.6.   Assessment & Plan:     Active Problems:    Right leg cellulitis - Blood cultures ordered. Will order wound culture. Will cover for staph (my primary suspicion) along with pseudomonas. Vanc, Zosyn.     Elevated glucose level. - Check A1C    Lactic acidosis - Tx as above. Trend.     Anxiety - Effexor     Hx of STEVIE - Ordering CPAP in case he wears    PT/OT evals and PPD ordered?  Not ordered; patient not expected to need rehab  Diet: ADULT DIET; Regular; 4 carb choices (60 gm/meal); Low Fat/Low Chol/High Fiber/2 gm Na  VTE prophylaxis: Lovenox  Code status: Full Code      Non-peripheral Lines and Tubes (if present):             Hospital Problems:  Principal Problem:    Cellulitis of right leg  Resolved Problems:    * No resolved hospital problems. *        Objective:   Patient Vitals for the past 24 hrs:   Temp Pulse Resp BP SpO2   24 1400 -- 82 18 137/75 97 %   24 1330 -- 87 16 132/72 94 %   24 1300 -- 85 14 138/73 95 %   24 1230 -- 94 18 138/78 94 %   24 1128 97.5 °F (36.4 °C) 96 18 133/78 95 %       Oxygen Therapy  SpO2: 97 %  Pulse via Oximetry: 82 beats per minute  O2 Device: None (Room air)    Estimated body mass index is 45.42 kg/m² as calculated from the following:    Height as of this encounter: 1.702 m (5' 7\").    Weight as of this encounter: 131.5 kg (290

## 2024-02-29 NOTE — ED NOTES
TRANSFER - OUT REPORT:    Verbal report given to LATOYA Franklin on Josué Pruitt  being transferred to [facility] for routine progression of patient care       Report consisted of patient's Situation, Background, Assessment and   Recommendations(SBAR).     Information from the following report(s) Nurse Handoff Report was reviewed with the receiving nurse.    Noemi Fall Assessment:    Presents to emergency department  because of falls (Syncope, seizure, or loss of consciousness): No  Age > 70: No  Altered Mental Status, Intoxication with alcohol or substance confusion (Disorientation, impaired judgment, poor safety awaremess, or inability to follow instructions): No  Impaired Mobility: Ambulates or transfers with assistive devices or assistance; Unable to ambulate or transer.: No             Lines:   Peripheral IV 02/29/24 Proximal;Right;Anterior Forearm (Active)        Opportunity for questions and clarification was provided.      Patient transported with:  Transport.     Leigh Mcclain RN  02/29/24 5033

## 2024-02-29 NOTE — ED PROVIDER NOTES
increased swelling.    The history is provided by the patient.       ROS     Review of Systems   Constitutional:  Positive for chills and fever.   Respiratory:  Negative for shortness of breath.    Cardiovascular:  Positive for leg swelling.   Gastrointestinal:  Negative for abdominal pain.   All other systems reviewed and are negative.       Physical Exam     Vitals signs and nursing note reviewed:  Vitals:    02/29/24 1230 02/29/24 1300 02/29/24 1330 02/29/24 1400   BP: 138/78 138/73 132/72 137/75   Pulse: 94 85 87 82   Resp: 18 14 16 18   Temp:       TempSrc:       SpO2: 94% 95% 94% 97%   Weight:       Height:          Physical Exam  Vitals and nursing note reviewed.   Constitutional:       General: He is not in acute distress.     Appearance: Normal appearance. He is not ill-appearing, toxic-appearing or diaphoretic.   HENT:      Head: Normocephalic and atraumatic.   Eyes:      Extraocular Movements: Extraocular movements intact.      Conjunctiva/sclera: Conjunctivae normal.   Cardiovascular:      Rate and Rhythm: Normal rate.      Pulses:           Dorsalis pedis pulses are 2+ on the right side and 2+ on the left side.        Posterior tibial pulses are 2+ on the right side and 2+ on the left side.      Heart sounds: Normal heart sounds.      Comments: Nonpitting edema to right leg  Pulmonary:      Effort: Pulmonary effort is normal. No respiratory distress.      Breath sounds: Normal breath sounds.   Musculoskeletal:      Cervical back: Normal range of motion.      Right lower leg: Edema present.      Left lower leg: No edema.   Skin:     General: Skin is warm and dry.      Findings: Erythema present.      Comments: Erythema to right lower leg.  No drainage at time of exam.  No areas of fluctuance.   Neurological:      General: No focal deficit present.      Mental Status: He is alert and oriented to person, place, and time.        Procedures     Procedures    Orders Placed This Encounter   Procedures    Blood

## 2024-03-01 PROBLEM — F41.9 ANXIETY: Chronic | Status: ACTIVE | Noted: 2024-03-01

## 2024-03-01 LAB
ALBUMIN SERPL-MCNC: 3.1 G/DL (ref 3.5–5)
ALBUMIN/GLOB SERPL: 1 (ref 0.4–1.6)
ALP SERPL-CCNC: 86 U/L (ref 50–136)
ALT SERPL-CCNC: 27 U/L (ref 12–65)
ANION GAP SERPL CALC-SCNC: 4 MMOL/L (ref 2–11)
AST SERPL-CCNC: 26 U/L (ref 15–37)
BASOPHILS # BLD: 0.1 K/UL (ref 0–0.2)
BASOPHILS NFR BLD: 1 % (ref 0–2)
BILIRUB DIRECT SERPL-MCNC: 0.1 MG/DL
BILIRUB SERPL-MCNC: 0.3 MG/DL (ref 0.2–1.1)
BUN SERPL-MCNC: 9 MG/DL (ref 6–23)
CALCIUM SERPL-MCNC: 8.7 MG/DL (ref 8.3–10.4)
CHLORIDE SERPL-SCNC: 110 MMOL/L (ref 103–113)
CO2 SERPL-SCNC: 28 MMOL/L (ref 21–32)
CREAT SERPL-MCNC: 0.9 MG/DL (ref 0.8–1.5)
DIFFERENTIAL METHOD BLD: ABNORMAL
EOSINOPHIL # BLD: 0.3 K/UL (ref 0–0.8)
EOSINOPHIL NFR BLD: 6 % (ref 0.5–7.8)
ERYTHROCYTE [DISTWIDTH] IN BLOOD BY AUTOMATED COUNT: 13.4 % (ref 11.9–14.6)
GLOBULIN SER CALC-MCNC: 3 G/DL (ref 2.8–4.5)
GLUCOSE SERPL-MCNC: 120 MG/DL (ref 65–100)
HCT VFR BLD AUTO: 42.2 % (ref 41.1–50.3)
HGB BLD-MCNC: 14.2 G/DL (ref 13.6–17.2)
IMM GRANULOCYTES # BLD AUTO: 0 K/UL (ref 0–0.5)
IMM GRANULOCYTES NFR BLD AUTO: 1 % (ref 0–5)
LYMPHOCYTES # BLD: 1.5 K/UL (ref 0.5–4.6)
LYMPHOCYTES NFR BLD: 25 % (ref 13–44)
MCH RBC QN AUTO: 30.2 PG (ref 26.1–32.9)
MCHC RBC AUTO-ENTMCNC: 33.6 G/DL (ref 31.4–35)
MCV RBC AUTO: 89.8 FL (ref 82–102)
MONOCYTES # BLD: 0.5 K/UL (ref 0.1–1.3)
MONOCYTES NFR BLD: 9 % (ref 4–12)
NEUTS SEG # BLD: 3.6 K/UL (ref 1.7–8.2)
NEUTS SEG NFR BLD: 58 % (ref 43–78)
NRBC # BLD: 0 K/UL (ref 0–0.2)
PLATELET # BLD AUTO: 253 K/UL (ref 150–450)
PMV BLD AUTO: 8.7 FL (ref 9.4–12.3)
POTASSIUM SERPL-SCNC: 4.2 MMOL/L (ref 3.5–5.1)
PROT SERPL-MCNC: 6.1 G/DL (ref 6.3–8.2)
RBC # BLD AUTO: 4.7 M/UL (ref 4.23–5.6)
SODIUM SERPL-SCNC: 142 MMOL/L (ref 136–146)
WBC # BLD AUTO: 6.1 K/UL (ref 4.3–11.1)

## 2024-03-01 PROCEDURE — 6370000000 HC RX 637 (ALT 250 FOR IP): Performed by: FAMILY MEDICINE

## 2024-03-01 PROCEDURE — 1100000000 HC RM PRIVATE

## 2024-03-01 PROCEDURE — 87077 CULTURE AEROBIC IDENTIFY: CPT

## 2024-03-01 PROCEDURE — 85025 COMPLETE CBC W/AUTO DIFF WBC: CPT

## 2024-03-01 PROCEDURE — 80076 HEPATIC FUNCTION PANEL: CPT

## 2024-03-01 PROCEDURE — 6360000002 HC RX W HCPCS: Performed by: FAMILY MEDICINE

## 2024-03-01 PROCEDURE — 87205 SMEAR GRAM STAIN: CPT

## 2024-03-01 PROCEDURE — 87186 SC STD MICRODIL/AGAR DIL: CPT

## 2024-03-01 PROCEDURE — 80048 BASIC METABOLIC PNL TOTAL CA: CPT

## 2024-03-01 PROCEDURE — 2580000003 HC RX 258: Performed by: FAMILY MEDICINE

## 2024-03-01 PROCEDURE — 87070 CULTURE OTHR SPECIMN AEROBIC: CPT

## 2024-03-01 PROCEDURE — 36415 COLL VENOUS BLD VENIPUNCTURE: CPT

## 2024-03-01 RX ADMIN — PIPERACILLIN AND TAZOBACTAM 4500 MG: 4; .5 INJECTION, POWDER, FOR SOLUTION INTRAVENOUS at 22:43

## 2024-03-01 RX ADMIN — PIPERACILLIN AND TAZOBACTAM 4500 MG: 4; .5 INJECTION, POWDER, FOR SOLUTION INTRAVENOUS at 07:20

## 2024-03-01 RX ADMIN — SODIUM CHLORIDE, PRESERVATIVE FREE 10 ML: 5 INJECTION INTRAVENOUS at 21:29

## 2024-03-01 RX ADMIN — ENOXAPARIN SODIUM 30 MG: 100 INJECTION SUBCUTANEOUS at 21:28

## 2024-03-01 RX ADMIN — VANCOMYCIN HYDROCHLORIDE 1250 MG: 10 INJECTION, POWDER, LYOPHILIZED, FOR SOLUTION INTRAVENOUS at 05:12

## 2024-03-01 RX ADMIN — VENLAFAXINE HYDROCHLORIDE 150 MG: 75 CAPSULE, EXTENDED RELEASE ORAL at 07:42

## 2024-03-01 RX ADMIN — ENOXAPARIN SODIUM 30 MG: 100 INJECTION SUBCUTANEOUS at 07:42

## 2024-03-01 RX ADMIN — VANCOMYCIN HYDROCHLORIDE 1250 MG: 10 INJECTION, POWDER, LYOPHILIZED, FOR SOLUTION INTRAVENOUS at 17:32

## 2024-03-01 RX ADMIN — SODIUM CHLORIDE, PRESERVATIVE FREE 10 ML: 5 INJECTION INTRAVENOUS at 07:51

## 2024-03-01 RX ADMIN — PIPERACILLIN AND TAZOBACTAM 4500 MG: 4; .5 INJECTION, POWDER, FOR SOLUTION INTRAVENOUS at 14:22

## 2024-03-01 RX ADMIN — HYDROXYZINE HYDROCHLORIDE 50 MG: 25 TABLET, FILM COATED ORAL at 21:28

## 2024-03-01 ASSESSMENT — PAIN SCALES - GENERAL: PAINLEVEL_OUTOF10: 5

## 2024-03-01 ASSESSMENT — PAIN DESCRIPTION - LOCATION: LOCATION: LEG

## 2024-03-01 ASSESSMENT — PAIN DESCRIPTION - ORIENTATION: ORIENTATION: RIGHT

## 2024-03-01 ASSESSMENT — PAIN DESCRIPTION - DESCRIPTORS: DESCRIPTORS: BURNING

## 2024-03-01 NOTE — ACP (ADVANCE CARE PLANNING)
Advance Care Planning     General Advance Care Planning (ACP) Conversation    Date of Conversation: 2/29/2024  Conducted with: Patient with Decision Making Capacity    Healthcare Decision Maker:    Primary Decision Maker: Idania Man - Spouse - [phone]  Click here to complete Healthcare Decision Makers including selection of the Healthcare Decision Maker Relationship (ie \"Primary\").  Today we documented Decision Maker(s) consistent with Legal Next of Kin hierarchy.    Content/Action Overview:  DECLINED ACP Conversation - will revisit periodically  Reviewed DNR/DNI and patient elects Full Code (Attempt Resuscitation)    Length of Voluntary ACP Conversation in minutes:  <16 minutes (Non-Billable)    Jeanette Xie RN

## 2024-03-01 NOTE — CARE COORDINATION
LOS 1d   54 y.o. male who presented to the ED for cc worsening right leg cellulitis despite being started on Bactrim two days ago at our ED.   Hx of anxiety, STEVIE, pulmonary emboli no longer on anticoagulation   Lives with spouse in a single level home  Independent of ADL's  Patient does not have a PCP will send referral to [facility] referrals. 3/1 (sent)    Discharge  plan is home with family assist and PCP referral.    Discharge plan ongoing, no further concerns as of present.   Please consult  if any new issues arise.  Will continue to follow.        ASSESSMENT NOTE    Attending Physician: Le Cazares MD  Admit Problem: Cellulitis of right leg [L03.115]  Cellulitis of right lower extremity [L03.115]  Date/Time of Admission: 2/29/2024 11:37 AM  Problem List:  Patient Active Problem List   Diagnosis    Acute pulmonary embolism (HCC)    Morbid obesity (HCC)    STEVIE (obstructive sleep apnea)    Acute medial meniscus tear of right knee    Right knee pain    Cellulitis of right leg        03/01/24 0951   Service Assessment   Patient Orientation Alert and Oriented   Cognition Alert   History Provided By Patient;Medical Record   Primary Caregiver Self   Accompanied By/Relationship n/a   Support Systems Spouse/Significant Other   Patient's Healthcare Decision Maker is: Legal Next of Kin   PCP Verified by CM No  (will send referral to [facility] for PCP assignment)   Prior Functional Level Independent in ADLs/IADLs   Current Functional Level Independent in ADLs/IADLs   Can patient return to prior living arrangement Yes   Ability to make needs known: Good   Family able to assist with home care needs: Yes   Would you like for me to discuss the discharge plan with any other family members/significant others, and if so, who? No   Financial Resources Other (Comment)  (commercial insurance)   Community Resources None   CM/SW Referral No PCP  (will send referral to [facility] for PCP referral)   Social/Functional History

## 2024-03-01 NOTE — PLAN OF CARE
Problem: Pain  Goal: Verbalizes/displays adequate comfort level or baseline comfort level  3/1/2024 0858 by Anayeli Kamara, RN  Outcome: Progressing  2/29/2024 2257 by Antoinette Nunez, RN  Outcome: Progressing  Flowsheets (Taken 2/29/2024 2230)  Verbalizes/displays adequate comfort level or baseline comfort level: Encourage patient to monitor pain and request assistance

## 2024-03-01 NOTE — PLAN OF CARE
Problem: Pain  Goal: Verbalizes/displays adequate comfort level or baseline comfort level  Outcome: Progressing  Flowsheets (Taken 2/29/2024 2230)  Verbalizes/displays adequate comfort level or baseline comfort level: Encourage patient to monitor pain and request assistance

## 2024-03-02 LAB
ANION GAP SERPL CALC-SCNC: 3 MMOL/L (ref 2–11)
BASOPHILS # BLD: 0.1 K/UL (ref 0–0.2)
BASOPHILS NFR BLD: 1 % (ref 0–2)
BUN SERPL-MCNC: 6 MG/DL (ref 6–23)
CALCIUM SERPL-MCNC: 8.7 MG/DL (ref 8.3–10.4)
CHLORIDE SERPL-SCNC: 112 MMOL/L (ref 103–113)
CO2 SERPL-SCNC: 27 MMOL/L (ref 21–32)
CREAT SERPL-MCNC: 0.9 MG/DL (ref 0.8–1.5)
DIFFERENTIAL METHOD BLD: ABNORMAL
EOSINOPHIL # BLD: 0.4 K/UL (ref 0–0.8)
EOSINOPHIL NFR BLD: 6 % (ref 0.5–7.8)
ERYTHROCYTE [DISTWIDTH] IN BLOOD BY AUTOMATED COUNT: 13.5 % (ref 11.9–14.6)
GLUCOSE SERPL-MCNC: 137 MG/DL (ref 65–100)
HCT VFR BLD AUTO: 44.6 % (ref 41.1–50.3)
HGB BLD-MCNC: 14.8 G/DL (ref 13.6–17.2)
IMM GRANULOCYTES # BLD AUTO: 0 K/UL (ref 0–0.5)
IMM GRANULOCYTES NFR BLD AUTO: 1 % (ref 0–5)
LYMPHOCYTES # BLD: 1.7 K/UL (ref 0.5–4.6)
LYMPHOCYTES NFR BLD: 27 % (ref 13–44)
MCH RBC QN AUTO: 30.1 PG (ref 26.1–32.9)
MCHC RBC AUTO-ENTMCNC: 33.2 G/DL (ref 31.4–35)
MCV RBC AUTO: 90.8 FL (ref 82–102)
MONOCYTES # BLD: 0.5 K/UL (ref 0.1–1.3)
MONOCYTES NFR BLD: 8 % (ref 4–12)
NEUTS SEG # BLD: 3.7 K/UL (ref 1.7–8.2)
NEUTS SEG NFR BLD: 57 % (ref 43–78)
NRBC # BLD: 0 K/UL (ref 0–0.2)
PLATELET # BLD AUTO: 250 K/UL (ref 150–450)
PMV BLD AUTO: 8.9 FL (ref 9.4–12.3)
POTASSIUM SERPL-SCNC: 4.4 MMOL/L (ref 3.5–5.1)
RBC # BLD AUTO: 4.91 M/UL (ref 4.23–5.6)
SODIUM SERPL-SCNC: 142 MMOL/L (ref 136–146)
VANCOMYCIN TROUGH SERPL-MCNC: 10.2 UG/ML
WBC # BLD AUTO: 6.4 K/UL (ref 4.3–11.1)

## 2024-03-02 PROCEDURE — 1100000000 HC RM PRIVATE

## 2024-03-02 PROCEDURE — 6370000000 HC RX 637 (ALT 250 FOR IP): Performed by: FAMILY MEDICINE

## 2024-03-02 PROCEDURE — 36415 COLL VENOUS BLD VENIPUNCTURE: CPT

## 2024-03-02 PROCEDURE — 6360000002 HC RX W HCPCS: Performed by: FAMILY MEDICINE

## 2024-03-02 PROCEDURE — 6360000002 HC RX W HCPCS: Performed by: INTERNAL MEDICINE

## 2024-03-02 PROCEDURE — 6370000000 HC RX 637 (ALT 250 FOR IP): Performed by: NURSE PRACTITIONER

## 2024-03-02 PROCEDURE — 80202 ASSAY OF VANCOMYCIN: CPT

## 2024-03-02 PROCEDURE — 2580000003 HC RX 258: Performed by: FAMILY MEDICINE

## 2024-03-02 PROCEDURE — 2580000003 HC RX 258: Performed by: INTERNAL MEDICINE

## 2024-03-02 PROCEDURE — 80048 BASIC METABOLIC PNL TOTAL CA: CPT

## 2024-03-02 PROCEDURE — 85025 COMPLETE CBC W/AUTO DIFF WBC: CPT

## 2024-03-02 RX ORDER — LOPERAMIDE HYDROCHLORIDE 2 MG/1
2 CAPSULE ORAL 4 TIMES DAILY PRN
Status: DISCONTINUED | OUTPATIENT
Start: 2024-03-02 | End: 2024-03-03 | Stop reason: HOSPADM

## 2024-03-02 RX ORDER — LACTOBACILLUS RHAMNOSUS GG 10B CELL
1 CAPSULE ORAL 2 TIMES DAILY WITH MEALS
Status: DISCONTINUED | OUTPATIENT
Start: 2024-03-02 | End: 2024-03-03 | Stop reason: HOSPADM

## 2024-03-02 RX ADMIN — Medication 1 CAPSULE: at 16:42

## 2024-03-02 RX ADMIN — PIPERACILLIN AND TAZOBACTAM 4500 MG: 4; .5 INJECTION, POWDER, FOR SOLUTION INTRAVENOUS at 22:47

## 2024-03-02 RX ADMIN — SODIUM CHLORIDE, PRESERVATIVE FREE 10 ML: 5 INJECTION INTRAVENOUS at 07:49

## 2024-03-02 RX ADMIN — SODIUM CHLORIDE, PRESERVATIVE FREE 10 ML: 5 INJECTION INTRAVENOUS at 20:07

## 2024-03-02 RX ADMIN — Medication 1 CAPSULE: at 10:48

## 2024-03-02 RX ADMIN — VANCOMYCIN HYDROCHLORIDE 1250 MG: 10 INJECTION, POWDER, LYOPHILIZED, FOR SOLUTION INTRAVENOUS at 04:37

## 2024-03-02 RX ADMIN — OXYCODONE 5 MG: 5 TABLET ORAL at 07:47

## 2024-03-02 RX ADMIN — ENOXAPARIN SODIUM 30 MG: 100 INJECTION SUBCUTANEOUS at 07:48

## 2024-03-02 RX ADMIN — PIPERACILLIN AND TAZOBACTAM 4500 MG: 4; .5 INJECTION, POWDER, FOR SOLUTION INTRAVENOUS at 14:03

## 2024-03-02 RX ADMIN — VENLAFAXINE HYDROCHLORIDE 150 MG: 75 CAPSULE, EXTENDED RELEASE ORAL at 07:47

## 2024-03-02 RX ADMIN — PIPERACILLIN AND TAZOBACTAM 4500 MG: 4; .5 INJECTION, POWDER, FOR SOLUTION INTRAVENOUS at 06:35

## 2024-03-02 RX ADMIN — OXYCODONE 5 MG: 5 TABLET ORAL at 22:14

## 2024-03-02 RX ADMIN — VANCOMYCIN HYDROCHLORIDE 1500 MG: 10 INJECTION, POWDER, LYOPHILIZED, FOR SOLUTION INTRAVENOUS at 16:44

## 2024-03-02 RX ADMIN — ENOXAPARIN SODIUM 30 MG: 100 INJECTION SUBCUTANEOUS at 20:33

## 2024-03-02 ASSESSMENT — PAIN DESCRIPTION - DESCRIPTORS
DESCRIPTORS: BURNING
DESCRIPTORS: THROBBING

## 2024-03-02 ASSESSMENT — PAIN DESCRIPTION - LOCATION
LOCATION: LEG
LOCATION: LEG

## 2024-03-02 ASSESSMENT — PAIN SCALES - GENERAL
PAINLEVEL_OUTOF10: 4
PAINLEVEL_OUTOF10: 3
PAINLEVEL_OUTOF10: 6
PAINLEVEL_OUTOF10: 0

## 2024-03-02 ASSESSMENT — PAIN - FUNCTIONAL ASSESSMENT: PAIN_FUNCTIONAL_ASSESSMENT: ACTIVITIES ARE NOT PREVENTED

## 2024-03-02 ASSESSMENT — PAIN DESCRIPTION - ORIENTATION
ORIENTATION: RIGHT
ORIENTATION: RIGHT;LOWER

## 2024-03-02 ASSESSMENT — PAIN DESCRIPTION - PAIN TYPE: TYPE: ACUTE PAIN

## 2024-03-02 ASSESSMENT — PAIN DESCRIPTION - FREQUENCY: FREQUENCY: INTERMITTENT

## 2024-03-02 ASSESSMENT — PAIN DESCRIPTION - ONSET: ONSET: AWAKENED FROM SLEEP

## 2024-03-02 NOTE — PLAN OF CARE
Problem: Pain  Goal: Verbalizes/displays adequate comfort level or baseline comfort level  3/2/2024 0935 by Anayeli Kamara RN  Outcome: Progressing  3/1/2024 1944 by Jeanette Leigh RN  Outcome: Progressing  Flowsheets (Taken 3/1/2024 1927)  Verbalizes/displays adequate comfort level or baseline comfort level: Encourage patient to monitor pain and request assistance     Problem: Discharge Planning  Goal: Discharge to home or other facility with appropriate resources  3/2/2024 0935 by Anayeli Kamara RN  Outcome: Progressing  3/1/2024 1944 by Jeanette Leigh RN  Outcome: Progressing  Flowsheets (Taken 3/1/2024 1927)  Discharge to home or other facility with appropriate resources: Identify barriers to discharge with patient and caregiver     Problem: Safety - Adult  Goal: Free from fall injury  3/2/2024 0935 by Anayeli Kamara RN  Outcome: Progressing  Flowsheets (Taken 3/2/2024 0325 by Jeanette Leigh RN)  Free From Fall Injury: Instruct family/caregiver on patient safety  3/1/2024 1944 by Jeanette Leigh RN  Outcome: Progressing  Flowsheets (Taken 3/1/2024 1927)  Free From Fall Injury: Instruct family/caregiver on patient safety

## 2024-03-03 VITALS
OXYGEN SATURATION: 92 % | HEART RATE: 76 BPM | DIASTOLIC BLOOD PRESSURE: 76 MMHG | TEMPERATURE: 97.5 F | SYSTOLIC BLOOD PRESSURE: 126 MMHG | HEIGHT: 67 IN | WEIGHT: 298.2 LBS | BODY MASS INDEX: 46.8 KG/M2 | RESPIRATION RATE: 18 BRPM

## 2024-03-03 LAB
BACTERIA SPEC CULT: ABNORMAL
GRAM STN SPEC: ABNORMAL
GRAM STN SPEC: ABNORMAL
SERVICE CMNT-IMP: ABNORMAL

## 2024-03-03 PROCEDURE — 6360000002 HC RX W HCPCS: Performed by: FAMILY MEDICINE

## 2024-03-03 PROCEDURE — 2580000003 HC RX 258: Performed by: FAMILY MEDICINE

## 2024-03-03 PROCEDURE — 6360000002 HC RX W HCPCS: Performed by: INTERNAL MEDICINE

## 2024-03-03 PROCEDURE — 6370000000 HC RX 637 (ALT 250 FOR IP): Performed by: NURSE PRACTITIONER

## 2024-03-03 PROCEDURE — 2580000003 HC RX 258: Performed by: INTERNAL MEDICINE

## 2024-03-03 PROCEDURE — 6370000000 HC RX 637 (ALT 250 FOR IP): Performed by: FAMILY MEDICINE

## 2024-03-03 RX ORDER — LACTOBACILLUS RHAMNOSUS GG 10B CELL
1 CAPSULE ORAL 2 TIMES DAILY WITH MEALS
Qty: 20 CAPSULE | Refills: 0 | Status: SHIPPED | OUTPATIENT
Start: 2024-03-03 | End: 2024-03-13

## 2024-03-03 RX ORDER — DOXYCYCLINE HYCLATE 100 MG
100 TABLET ORAL 2 TIMES DAILY
Qty: 14 TABLET | Refills: 0 | Status: SHIPPED | OUTPATIENT
Start: 2024-03-03 | End: 2024-03-10

## 2024-03-03 RX ADMIN — VENLAFAXINE HYDROCHLORIDE 150 MG: 75 CAPSULE, EXTENDED RELEASE ORAL at 07:33

## 2024-03-03 RX ADMIN — Medication 1 CAPSULE: at 07:33

## 2024-03-03 RX ADMIN — VANCOMYCIN HYDROCHLORIDE 1500 MG: 10 INJECTION, POWDER, LYOPHILIZED, FOR SOLUTION INTRAVENOUS at 04:31

## 2024-03-03 RX ADMIN — PIPERACILLIN AND TAZOBACTAM 4500 MG: 4; .5 INJECTION, POWDER, FOR SOLUTION INTRAVENOUS at 06:32

## 2024-03-03 RX ADMIN — SODIUM CHLORIDE, PRESERVATIVE FREE 10 ML: 5 INJECTION INTRAVENOUS at 07:33

## 2024-03-03 RX ADMIN — ENOXAPARIN SODIUM 30 MG: 100 INJECTION SUBCUTANEOUS at 07:33

## 2024-03-03 ASSESSMENT — PAIN SCALES - GENERAL: PAINLEVEL_OUTOF10: 0

## 2024-03-03 NOTE — DISCHARGE SUMMARY
02/29/2024 12:23 PM      Lipids No results found for: \"CHOL\", \"LDLCALC\", \"HDL\", \"CHOLHDLRATIO\", \"TRIG\"   Thyroid  No results found for: \"TSHELE\", \"[id number]\"     Most Recent UA No results found for: \"COLORU\", \"APPEARANCE\", \"SPECGRAV\", \"LABPH\", \"PROTEINU\", \"GLUCOSEU\", \"KETUA\", \"BILIRUBINUR\", \"BLOODU\", \"UROBILINOGEN\", \"NITRU\", \"LEUKOCYTESUR\", \"WBCUA\", \"RBCUA\", \"EPITHUA\", \"BACTERIA\", \"LABCAST\", \"MUCUS\"     Microbiology:  Results       Procedure Component Value Units Date/Time    Culture, Wound [[id number]]  (Abnormal)  (Susceptibility) Collected: 03/01/24 1056    Order Status: Completed Specimen: Right Updated: 03/03/24 0856     Special Requests NO SPECIAL REQUESTS        Gram stain OCCASIONAL WBCS SEEN         NO ORGANISMS SEEN        Culture       SCANT Methicillin Resistant Staphylococcus aureus          Susceptibility        Methicillin-Resistant Staphylococcus aureus      BACTERIAL SUSCEPTIBILITY PANEL KEITH      clindamycin <=0.5 ug/mL Sensitive      oxacillin >2 ug/mL Resistant      rifampin <=0.5 ug/mL Sensitive  [1]       tetracycline <=0.5 ug/mL Sensitive      trimethoprim-sulfamethoxazole <=0.5/9.5 ug/mL Sensitive      vancomycin 1 ug/mL Sensitive                   [1]  Rifampin is not to be used for mono-therapy.                   Blood Culture 2 [[id number]] Collected: 02/29/24 1639    Order Status: Completed Specimen: Blood Updated: 03/03/24 0751     Special Requests --        LEFT  HAND       Culture NO GROWTH 3 DAYS       Culture, Wound [[id number]]     Order Status: Canceled Specimen: Leg     Blood Culture 1 [[id number]] Collected: 02/29/24 1223    Order Status: Completed Specimen: Blood Updated: 03/03/24 0751     Special Requests --        NO SPECIAL REQUESTS  RIGHT  Antecubital       Culture NO GROWTH 3 DAYS               All Labs from Last 24 Hrs:  No results found for this or any previous visit (from the past 24 hour(s)).    No results for input(s): \"COVID19\" in the last 72 hours.    Recent Vital

## 2024-03-03 NOTE — PROGRESS NOTES
Physician Progress Note      PATIENT:               MARGY DURHAM  [facility] #:                  [id number]  :                       1970  ADMIT DATE:       2024 11:37 AM  DISCH DATE:  RESPONDING  PROVIDER #:        Nandini Garcia DO          QUERY TEXT:    Patient admitted with BMI 47. If possible, please document in progress notes   and discharge summary if you are evaluating and /or treating any of the   following:    The medical record reflects the following:  Risk Factors: STEVIE, elevated glucose, leg cellulitis  Clinical Indicators: Patient admitted with BMI 47. ED provider notes hx of   morbid obesity.  Treatment: CPAP for STEVIE, low fat and low cholesterol diet with carb control    ?Specificity of obesity and morbid obesity should be reported based on   physician documentation, as there are several published classifications and   definitions?  MS-DRG Training Guide. CDC:   https://www.cdc.gov/obesity/basics/adult-defining.html. WHO:   https://www.who.int/news-room/fact-sheets/detail/obesity-and-overweight. NIH:   https://www.nhlbi.nih.gov/health/educational/lose_wt/BMI/bmi_dis.htm    Thank you,  Demar Ruiz RN CDI  [email or web address]  Options provided:  -- Morbid obesity  -- Other - I will add my own diagnosis  -- Disagree - Not applicable / Not valid  -- Disagree - Clinically unable to determine / Unknown  -- Refer to Clinical Documentation Reviewer    PROVIDER RESPONSE TEXT:    This patient has morbid obesity.    Query created by: Demar Ruiz on 3/1/2024 10:59 AM      Electronically signed by:  Nandini Garcia DO 3/1/2024 1:39 PM          
EOS notes:    Remains on IV antibiotics for RLE cellulitis. Had x1 po Oxyir for pain.  Instructed to keep RLE elevated;refused.    Awaiting to be seen by wound care team.  
Patient assessed for NIV HS need as ordered by MD. Patient refused to wear NIV while on admission and is totally non compliant at home. Will pass along to oncoming shift this night. Plan of care ongoing.  
Per note, pt non-compliant with NIV and refusing while in hospital.  
Reviewed DC instructions with pt and wife verbalized understanding IV removed answered questions to the best of my ability   
VANCO DAILY FOLLOW UP NOTE  [facility]   Pharmacy Pharmacokinetic Monitoring Service - Vancomycin    Consulting Provider: Nandini Garcia DO    Indication: Skin and Soft Tissue Infection   Target Concentration: Goal AUC/KEITH 400-600 mg*hr/L  Day of Therapy: 2 of 7   Additional Antimicrobials: piperacillin-tazobactam    Pertinent Laboratory Values:   Wt Readings from Last 1 Encounters:   02/29/24 (!) 138.3 kg (304 lb 12.8 oz)     Temp Readings from Last 1 Encounters:   03/01/24 97.5 °F (36.4 °C) (Oral)     Recent Labs     02/29/24  1223 02/29/24  1639 03/01/24  0526   BUN 9  --  9   CREATININE 1.00  --  0.90   WBC 8.0  --  6.1   PROCAL <0.05  --   --    LACACIDPL  --  1.4  --    LACTSEPSIS 2.6*  --   --      Estimated Creatinine Clearance: 129 mL/min (based on SCr of 0.9 mg/dL).    No results found for: \"VANCOTROUGH\", \"VANCORANDOM\"    MRSA Nasal Swab: N/A. Non-respiratory infection    Assessment:  Date/Time Dose Concentration AUC         Note: Serum concentrations collected for AUC dosing may appear elevated if collected in close proximity to the dose administered, this is not necessarily an indication of toxicity    Plan:  Dosing recommendations based on Bayesian software  Continue vancomycin 1250 mg IV every 12 hours  Anticipated AUC of 590 and trough concentration of 16.9 at steady state  Renal labs as indicated   Vancomycin concentrations will be ordered as clinically appropriate   Pharmacy will continue to monitor patient and adjust therapy as indicated    Thank you for the consult,  Jessica Schwartz [facility]  
VANCO DAILY FOLLOW UP NOTE  [facility]   Pharmacy Pharmacokinetic Monitoring Service - Vancomycin    Consulting Provider: Nandini Garcia DO    Indication: Skin and Soft Tissue Infection   Target Concentration: Goal AUC/KEITH 400-600 mg*hr/L  Day of Therapy: 4 of 7   Additional Antimicrobials: piperacillin-tazobactam    Pertinent Laboratory Values:   Wt Readings from Last 1 Encounters:   03/02/24 135.3 kg (298 lb 3.2 oz)     Temp Readings from Last 1 Encounters:   03/03/24 97.5 °F (36.4 °C) (Oral)     Recent Labs     02/29/24  1223 02/29/24  1639 03/01/24  0526 03/02/24  0420   BUN 9  --  9 6   CREATININE 1.00  --  0.90 0.90   WBC 8.0  --  6.1 6.4   PROCAL <0.05  --   --   --    LACACIDPL  --  1.4  --   --    LACTSEPSIS 2.6*  --   --   --      Estimated Creatinine Clearance: 127 mL/min (based on SCr of 0.9 mg/dL).    Lab Results   Component Value Date/Time    VANCOTROUGH 10.2 03/02/2024 04:20 AM       MRSA Nasal Swab: N/A. Non-respiratory infection    Assessment:  Date/Time Dose Concentration AUC   3/2 0420 1250 mg q12h 10.2 423/8.7   Note: Serum concentrations collected for AUC dosing may appear elevated if collected in close proximity to the dose administered, this is not necessarily an indication of toxicity    Plan:  Continue 1500 mg q12h for predicted AUC/Tr of 508/10.7  Repeat vancomycin concentrations will be ordered as clinically appropriate   Pharmacy will continue to monitor patient and adjust therapy as indicated    Thank you for the consult,  Jovan Wilhelm, PharmD, BCOP  Clinical Pharmacist  Contact [employer]          
VANCO DAILY FOLLOW UP NOTE  [facility]   Pharmacy Pharmacokinetic Monitoring Service - Vancomycin    Consulting Provider: Nandini Garcia DO    Indication: Skin and Soft Tissue Infection   Target Concentration: Goal AUC/KEITH 400-600 mg*hr/L  Day of Therapy: 1 of 7   Additional Antimicrobials: piperacillin-tazobactam    Pertinent Laboratory Values:   Wt Readings from Last 1 Encounters:   02/29/24 131.5 kg (290 lb)     Temp Readings from Last 1 Encounters:   02/29/24 97.5 °F (36.4 °C) (Oral)     Recent Labs     02/27/24  0035 02/29/24  1223   BUN 9 9   CREATININE 1.00 1.00   WBC 13.9* 8.0   PROCAL 0.12 <0.05   LACTSEPSIS  --  2.6*     Estimated Creatinine Clearance: 113 mL/min (based on SCr of 1 mg/dL).    No results found for: \"VANCOTROUGH\", \"VANCORANDOM\"    MRSA Nasal Swab: N/A. Non-respiratory infection    Assessment:  Date/Time Dose Concentration AUC         Note: Serum concentrations collected for AUC dosing may appear elevated if collected in close proximity to the dose administered, this is not necessarily an indication of toxicity    Plan:  Dosing recommendations based on Bayesian software  Start vancomycin 2500 mg IV x 1 followed by vancomycin 1250 mg IV every 12 hours  Anticipated AUC of 590 and trough concentration of 16.9 at steady state  Renal labs as indicated   Vancomycin concentrations will be ordered as clinically appropriate   Pharmacy will continue to monitor patient and adjust therapy as indicated    Thank you for the consult,  Marie Frye RPH    
choices (60 gm/meal); Low Fat/Low Chol/High Fiber/2 gm Na  VTE prophylaxis: Lovenox  Code status: Full Code      Non-peripheral Lines and Tubes (if present):          Telemetry (if present):  Cardiac/Telemetry Monitor On: No        Hospital Problems:  Principal Problem:    Cellulitis of right leg  Active Problems:    Anxiety  Resolved Problems:    * No resolved hospital problems. *      Objective:   Patient Vitals for the past 24 hrs:   Temp Pulse Resp BP SpO2   03/02/24 0817 -- -- 18 -- --   03/02/24 0803 97.4 °F (36.3 °C) 82 18 (!) 122/96 95 %   03/02/24 0250 97.3 °F (36.3 °C) 87 18 133/83 97 %   03/01/24 2239 98.2 °F (36.8 °C) 75 19 110/62 95 %   03/01/24 1927 98 °F (36.7 °C) 86 18 (!) 154/89 96 %   03/01/24 1352 98.2 °F (36.8 °C) 88 16 (!) 110/57 98 %       Oxygen Therapy  SpO2: 95 %  Pulse via Oximetry: 78 beats per minute  O2 Device: None (Room air)    Estimated body mass index is 47.74 kg/m² as calculated from the following:    Height as of this encounter: 1.702 m (5' 7\").    Weight as of this encounter: 138.3 kg (304 lb 12.8 oz).    Intake/Output Summary (Last 24 hours) at 3/2/2024 1058  Last data filed at 3/2/2024 0636  Gross per 24 hour   Intake 1807.5 ml   Output --   Net 1807.5 ml         Physical Exam:   General:    NAD  Head:  Normocephalic, atraumatic  Eyes:  Sclerae appear normal.  Pupils equally round.  ENT:  Nares appear normal.  Moist oral mucosa  Lungs:   No wheezing.  Symmetric expansion.  Extremities: Edema, erythema to RLE.  None to LLE.  Neuro:  CN II-XII grossly intact.    Psych:  Normal mood and affect.      I have personally reviewed labs and tests:  Recent Labs:  Recent Results (from the past 48 hour(s))   CBC with Auto Differential    Collection Time: 02/29/24 12:23 PM   Result Value Ref Range    WBC 8.0 4.3 - 11.1 K/uL    RBC 4.98 4.23 - 5.6 M/uL    Hemoglobin 15.0 13.6 - 17.2 g/dL    Hematocrit 44.7 41.1 - 50.3 %    MCV 89.8 82 - 102 FL    MCH 30.1 26.1 - 32.9 PG    MCHC 33.6 31.4 - 
SPECIAL REQUESTS  RIGHT  Antecubital        Culture NO GROWTH AFTER 19 HOURS     Hemoglobin A1C    Collection Time: 02/29/24 12:23 PM   Result Value Ref Range    Hemoglobin A1C 5.9 (H) 4.8 - 5.6 %    Estimated Avg Glucose 123 mg/dL   Blood Culture 2    Collection Time: 02/29/24  4:39 PM    Specimen: Blood   Result Value Ref Range    Special Requests LEFT  HAND        Culture NO GROWTH AFTER 15 HOURS     Lactic Acid    Collection Time: 02/29/24  4:39 PM   Result Value Ref Range    Lactic Acid, Plasma 1.4 0.4 - 2.0 MMOL/L   Basic Metabolic Panel w/ Reflex to MG    Collection Time: 03/01/24  5:26 AM   Result Value Ref Range    Sodium 142 136 - 146 mmol/L    Potassium 4.2 3.5 - 5.1 mmol/L    Chloride 110 103 - 113 mmol/L    CO2 28 21 - 32 mmol/L    Anion Gap 4 2 - 11 mmol/L    Glucose 120 (H) 65 - 100 mg/dL    BUN 9 6 - 23 MG/DL    Creatinine 0.90 0.8 - 1.5 MG/DL    Est, Glom Filt Rate >60 >60 ml/min/1.73m2    Calcium 8.7 8.3 - 10.4 MG/DL   CBC with Auto Differential    Collection Time: 03/01/24  5:26 AM   Result Value Ref Range    WBC 6.1 4.3 - 11.1 K/uL    RBC 4.70 4.23 - 5.6 M/uL    Hemoglobin 14.2 13.6 - 17.2 g/dL    Hematocrit 42.2 41.1 - 50.3 %    MCV 89.8 82 - 102 FL    MCH 30.2 26.1 - 32.9 PG    MCHC 33.6 31.4 - 35.0 g/dL    RDW 13.4 11.9 - 14.6 %    Platelets 253 150 - 450 K/uL    MPV 8.7 (L) 9.4 - 12.3 FL    nRBC 0.00 0.0 - 0.2 K/uL    Differential Type AUTOMATED      Neutrophils % 58 43 - 78 %    Lymphocytes % 25 13 - 44 %    Monocytes % 9 4.0 - 12.0 %    Eosinophils % 6 0.5 - 7.8 %    Basophils % 1 0.0 - 2.0 %    Immature Granulocytes 1 0.0 - 5.0 %    Neutrophils Absolute 3.6 1.7 - 8.2 K/UL    Lymphocytes Absolute 1.5 0.5 - 4.6 K/UL    Monocytes Absolute 0.5 0.1 - 1.3 K/UL    Eosinophils Absolute 0.3 0.0 - 0.8 K/UL    Basophils Absolute 0.1 0.0 - 0.2 K/UL    Absolute Immature Granulocyte 0.0 0.0 - 0.5 K/UL   Hepatic Function Panel    Collection Time: 03/01/24  5:26 AM   Result Value Ref Range    Total

## 2024-03-03 NOTE — PLAN OF CARE
Problem: Pain  Goal: Verbalizes/displays adequate comfort level or baseline comfort level  3/3/2024 0902 by Anayeli Kamara RN  Outcome: Progressing  Flowsheets (Taken 3/2/2024 2214 by Regina Gill RN)  Verbalizes/displays adequate comfort level or baseline comfort level:   Encourage patient to monitor pain and request assistance   Assess pain using appropriate pain scale   Administer analgesics based on type and severity of pain and evaluate response   Implement non-pharmacological measures as appropriate and evaluate response  3/2/2024 1957 by Regina Gill RN  Outcome: Progressing     Problem: Discharge Planning  Goal: Discharge to home or other facility with appropriate resources  3/3/2024 0902 by Anayeli Kamara RN  Outcome: Progressing  3/2/2024 1957 by Regina Gill RN  Outcome: Progressing  Flowsheets (Taken 3/2/2024 1930)  Discharge to home or other facility with appropriate resources: Identify barriers to discharge with patient and caregiver     Problem: Safety - Adult  Goal: Free from fall injury  3/3/2024 0902 by Anayeli Kamara RN  Outcome: Progressing  3/2/2024 1957 by Regina Gill RN  Outcome: Progressing  Flowsheets (Taken 3/2/2024 1930)  Free From Fall Injury: Instruct family/caregiver on patient safety

## 2024-03-03 NOTE — CARE COORDINATION
CM reviewed / screen patient for discharge today.  CM reviewed medical chart / discharge summary: Disposition: Home   and CM weekend report: No PCP/ referral sent to [facility] PCP  3/1/2024 sent referral.  Do not anticipate any other needs currently. Family will transport patient home.  Patient has met all treatment goals / milestones.  CM will continue to follow and remain available for any needs, concerns or questions that may arise.           03/01/24 0934   Service Assessment   Patient Orientation Alert and Oriented   Cognition Alert   History Provided By Patient;Medical Record   Primary Caregiver Self   Accompanied By/Relationship n/a   Support Systems Spouse/Significant Other   Patient's Healthcare Decision Maker is: Legal Next of Kin   PCP Verified by CM No  (will send referral to [facility] for PCP assignment)   Prior Functional Level Independent in ADLs/IADLs   Current Functional Level Independent in ADLs/IADLs   Can patient return to prior living arrangement Yes   Ability to make needs known: Good   Family able to assist with home care needs: Yes   Would you like for me to discuss the discharge plan with any other family members/significant others, and if so, who? No   Financial Resources Other (Comment)  (commercial insurance)   Community Resources None   CM/SW Referral No PCP  (will send referral to [facility] for PCP referral)   Social/Functional History   Lives With Significant other   Type of Home Mobile home   Home Layout One level   Bathroom Shower/Tub Tub/Shower unit   Bathroom Toilet Standard   Bathroom Equipment Commode   Bathroom Accessibility Accessible   Receives Help From Family   ADL Assistance Independent   Ambulation Assistance Independent   Transfer Assistance Independent   Active  Yes   Mode of Transportation Car   Occupation Full time employment   Discharge Planning   Type of Residence House   Living Arrangements Spouse/Significant Other;Children   Current Services Prior To Admission Home [facility]

## 2024-03-05 LAB
BACTERIA SPEC CULT: NORMAL
BACTERIA SPEC CULT: NORMAL
SERVICE CMNT-IMP: NORMAL
SERVICE CMNT-IMP: NORMAL